# Patient Record
Sex: FEMALE | Race: WHITE | NOT HISPANIC OR LATINO | Employment: OTHER | URBAN - METROPOLITAN AREA
[De-identification: names, ages, dates, MRNs, and addresses within clinical notes are randomized per-mention and may not be internally consistent; named-entity substitution may affect disease eponyms.]

---

## 2020-07-04 ENCOUNTER — HOSPITAL ENCOUNTER (OUTPATIENT)
Facility: MEDICAL CENTER | Age: 75
End: 2020-07-04
Attending: PHYSICIAN ASSISTANT
Payer: MEDICARE

## 2020-07-04 ENCOUNTER — OFFICE VISIT (OUTPATIENT)
Dept: URGENT CARE | Facility: PHYSICIAN GROUP | Age: 75
End: 2020-07-04
Payer: MEDICARE

## 2020-07-04 ENCOUNTER — TELEPHONE (OUTPATIENT)
Dept: URGENT CARE | Facility: PHYSICIAN GROUP | Age: 75
End: 2020-07-04

## 2020-07-04 VITALS
TEMPERATURE: 97.6 F | DIASTOLIC BLOOD PRESSURE: 70 MMHG | HEART RATE: 97 BPM | OXYGEN SATURATION: 98 % | WEIGHT: 150 LBS | RESPIRATION RATE: 20 BRPM | HEIGHT: 63 IN | SYSTOLIC BLOOD PRESSURE: 140 MMHG | BODY MASS INDEX: 26.58 KG/M2

## 2020-07-04 DIAGNOSIS — R30.0 DYSURIA: ICD-10-CM

## 2020-07-04 DIAGNOSIS — N39.0 URINARY TRACT INFECTION WITH HEMATURIA, SITE UNSPECIFIED: ICD-10-CM

## 2020-07-04 DIAGNOSIS — R31.9 URINARY TRACT INFECTION WITH HEMATURIA, SITE UNSPECIFIED: ICD-10-CM

## 2020-07-04 LAB
APPEARANCE UR: NORMAL
BILIRUB UR STRIP-MCNC: NEGATIVE MG/DL
COLOR UR AUTO: NORMAL
GLUCOSE UR STRIP.AUTO-MCNC: NEGATIVE MG/DL
KETONES UR STRIP.AUTO-MCNC: NEGATIVE MG/DL
LEUKOCYTE ESTERASE UR QL STRIP.AUTO: NORMAL
NITRITE UR QL STRIP.AUTO: NORMAL
PH UR STRIP.AUTO: 6.5 [PH] (ref 5–8)
PROT UR QL STRIP: 30 MG/DL
RBC UR QL AUTO: NORMAL
SP GR UR STRIP.AUTO: 1.01
UROBILINOGEN UR STRIP-MCNC: 0.2 MG/DL

## 2020-07-04 PROCEDURE — 87186 SC STD MICRODIL/AGAR DIL: CPT

## 2020-07-04 PROCEDURE — 81002 URINALYSIS NONAUTO W/O SCOPE: CPT | Performed by: PHYSICIAN ASSISTANT

## 2020-07-04 PROCEDURE — 87086 URINE CULTURE/COLONY COUNT: CPT

## 2020-07-04 PROCEDURE — 99204 OFFICE O/P NEW MOD 45 MIN: CPT | Performed by: PHYSICIAN ASSISTANT

## 2020-07-04 PROCEDURE — 87077 CULTURE AEROBIC IDENTIFY: CPT

## 2020-07-04 RX ORDER — CEFDINIR 300 MG/1
300 CAPSULE ORAL EVERY 12 HOURS
Qty: 10 CAP | Refills: 0 | Status: SHIPPED | OUTPATIENT
Start: 2020-07-04 | End: 2020-07-04 | Stop reason: SDUPTHER

## 2020-07-04 RX ORDER — CEFDINIR 300 MG/1
300 CAPSULE ORAL EVERY 12 HOURS
Qty: 10 CAP | Refills: 0 | Status: SHIPPED | OUTPATIENT
Start: 2020-07-04 | End: 2020-07-09

## 2020-07-04 SDOH — HEALTH STABILITY: MENTAL HEALTH: HOW OFTEN DO YOU HAVE A DRINK CONTAINING ALCOHOL?: NEVER

## 2020-07-04 ASSESSMENT — ENCOUNTER SYMPTOMS
SHORTNESS OF BREATH: 0
VOMITING: 0
COUGH: 0
EYE DISCHARGE: 0
FEVER: 0
FLANK PAIN: 0
HEADACHES: 0
NAUSEA: 0
EYE REDNESS: 0
SORE THROAT: 0

## 2020-07-04 NOTE — PROGRESS NOTES
"Subjective:      Shelby Mireles is a 75 y.o. female who presents with Dysuria (x4 days)        Dysuria    This is a new problem. Episode onset: x 4 days ago. The problem occurs every urination. The problem has been unchanged. The quality of the pain is described as burning. There has been no fever. There is no history of pyelonephritis. Associated symptoms include frequency, hematuria and urgency. Pertinent negatives include no flank pain, nausea or vomiting. She has tried increased fluids for the symptoms. There is no history of kidney stones.     PMH:  has no past medical history on file.  MEDS: No current outpatient medications on file.  ALLERGIES:   Allergies   Allergen Reactions   • Tetracycline      Pt states she got a rash     SURGHX: No past surgical history on file.  SOCHX:  reports that she has never smoked. She has never used smokeless tobacco. She reports that she does not drink alcohol.  FH: Family history was reviewed, no pertinent findings to report      Review of Systems   Constitutional: Negative for fever.   HENT: Negative for congestion, ear pain and sore throat.    Eyes: Negative for discharge and redness.   Respiratory: Negative for cough and shortness of breath.    Cardiovascular: Negative for chest pain and leg swelling.   Gastrointestinal: Negative for nausea and vomiting.   Genitourinary: Positive for dysuria, frequency, hematuria and urgency. Negative for flank pain.   Musculoskeletal: Negative for joint pain.   Skin: Negative for rash.   Neurological: Negative for headaches.   All other systems reviewed and are negative.         Objective:     /70   Pulse 97   Temp 36.4 °C (97.6 °F) (Temporal)   Resp 20   Ht 1.6 m (5' 3\")   Wt 68 kg (150 lb)   SpO2 98%   BMI 26.57 kg/m²      Physical Exam  Constitutional:       General: She is not in acute distress.     Appearance: Normal appearance. She is well-developed. She is not ill-appearing.   HENT:      Head: Normocephalic and atraumatic. "      Right Ear: External ear normal.      Left Ear: External ear normal.      Nose: Nose normal.   Eyes:      Extraocular Movements: Extraocular movements intact.      Conjunctiva/sclera: Conjunctivae normal.   Neck:      Musculoskeletal: Normal range of motion and neck supple.   Cardiovascular:      Rate and Rhythm: Normal rate and regular rhythm.      Heart sounds: Normal heart sounds.   Pulmonary:      Effort: Pulmonary effort is normal. No respiratory distress.      Breath sounds: Normal breath sounds. No wheezing.   Abdominal:      Palpations: Abdomen is soft.      Tenderness: There is no abdominal tenderness. There is no right CVA tenderness or left CVA tenderness.   Musculoskeletal: Normal range of motion.   Skin:     General: Skin is warm and dry.   Neurological:      Mental Status: She is alert and oriented to person, place, and time.            Progress:  Results for orders placed or performed in visit on 07/04/20   POCT Urinalysis   Result Value Ref Range    POC Color light yellow Negative    POC Appearance slightly cloudy Negative    POC Leukocyte Esterase large Negative    POC Nitrites negtaive Negative    POC Urobiligen 0.2 Negative (0.2) mg/dL    POC Protein 30 Negative mg/dL    POC Urine PH 6.5 5.0 - 8.0    POC Blood large Negative    POC Specific Gravity 1.010 <1.005 - >1.030    POC Ketones negative Negative mg/dL    POC Bilirubin negative Negative mg/dL    POC Glucose negative Negative mg/dL     Urine Culture - pending      Assessment/Plan:     1. Dysuria  - POCT Urinalysis  - Urine Culture; Future    2. Urinary tract infection with hematuria, site unspecified  - cefdinir (OMNICEF) 300 MG Cap; Take 1 Cap by mouth every 12 hours for 5 days.  Dispense: 10 Cap; Refill: 0    The patient's presenting symptoms and physical exam findings are consistent with dysuria likely secondary to an acute urinary tract infection.  The patient's physical exam today in clinic was normal.  No CVA tenderness was  appreciated.  The patient's vital signs are stable and within normal limits.  The patient's urinalysis today in clinic showed large leukocyte esterace and large blood.  Will culture the patient's urine to identify the possible bacterial source.  Will prescribe the patient Cefdinir for presumed urinary tract infection.  Based on the patient's presenting symptoms and physical exam findings, it is unlikely the patient symptoms are due to acute pyelonephritis given the absence of fever, vomiting, and acute flank pain.  The patient reports no history of kidney stones.  Recommend OTC medications and supportive care for symptomatic management.  Recommend patient follow-up with her PCP.  Discussed return precautions with the patient, and she verbalized understanding.    Differential diagnoses, supportive care, and indications for immediate follow-up discussed with patient.   Instructed to return to clinic or nearest emergency department for any change in condition, further concerns, or worsening of symptoms.    OTC Tylenol or Motrin for fever/discomfort.  Drink plenty of fluids  Follow-up with PCP  Return to clinic or go to the ED if symptoms worsen or fail to improve, or if the patient should develop worsening/increasing urinary symptoms, hematuria, flank pain, abdominal pain, nausea/vomiting, fever/chills, and/or any concerning symptoms.    Discussed plan with the patient, and she agrees to the above.     Please note that this dictation was created using voice recognition software. I have made every reasonable attempt to correct obvious errors, but I expect that there may be errors of grammar and possibly content that I did not discover before finalizing the note.

## 2020-07-04 NOTE — TELEPHONE ENCOUNTER
Pt RX for UTI Was called into [facility] on [address], Not open today, Please call in to [facility] on  [address] open 24 hrs.    Thank you   Maureen

## 2020-07-05 LAB
AMBIGUOUS DTTM AMBI4: NORMAL
SIGNIFICANT IND 70042: NORMAL
SITE SITE: NORMAL
SOURCE SOURCE: NORMAL

## 2020-07-07 LAB
BACTERIA UR CULT: ABNORMAL
BACTERIA UR CULT: ABNORMAL
SIGNIFICANT IND 70042: ABNORMAL
SITE SITE: ABNORMAL
SOURCE SOURCE: ABNORMAL

## 2021-01-12 DIAGNOSIS — Z23 NEED FOR VACCINATION: ICD-10-CM

## 2021-04-14 ENCOUNTER — OFFICE VISIT (OUTPATIENT)
Dept: URGENT CARE | Facility: PHYSICIAN GROUP | Age: 76
End: 2021-04-14
Payer: MEDICARE

## 2021-04-14 ENCOUNTER — HOSPITAL ENCOUNTER (OUTPATIENT)
Dept: LAB | Facility: MEDICAL CENTER | Age: 76
End: 2021-04-14
Attending: PHYSICIAN ASSISTANT
Payer: MEDICARE

## 2021-04-14 VITALS
TEMPERATURE: 97.8 F | RESPIRATION RATE: 18 BRPM | DIASTOLIC BLOOD PRESSURE: 76 MMHG | SYSTOLIC BLOOD PRESSURE: 138 MMHG | HEART RATE: 115 BPM | HEIGHT: 63 IN | OXYGEN SATURATION: 97 % | BODY MASS INDEX: 27.29 KG/M2 | WEIGHT: 154 LBS

## 2021-04-14 DIAGNOSIS — R14.0 ABDOMINAL DISTENSION (GASEOUS): ICD-10-CM

## 2021-04-14 DIAGNOSIS — R14.0 BLOATING: ICD-10-CM

## 2021-04-14 DIAGNOSIS — R11.0 NAUSEA: ICD-10-CM

## 2021-04-14 DIAGNOSIS — K57.30 DIVERTICULOSIS OF COLON: ICD-10-CM

## 2021-04-14 DIAGNOSIS — Z87.19 HISTORY OF HIATAL HERNIA: ICD-10-CM

## 2021-04-14 DIAGNOSIS — R10.84 GENERALIZED ABDOMINAL PAIN: ICD-10-CM

## 2021-04-14 LAB
ALBUMIN SERPL BCP-MCNC: 4.8 G/DL (ref 3.2–4.9)
ALBUMIN/GLOB SERPL: 1.7 G/DL
ALP SERPL-CCNC: 86 U/L (ref 30–99)
ALT SERPL-CCNC: 31 U/L (ref 2–50)
ANION GAP SERPL CALC-SCNC: 14 MMOL/L (ref 7–16)
APPEARANCE UR: CLEAR
AST SERPL-CCNC: 29 U/L (ref 12–45)
BASOPHILS # BLD AUTO: 0.4 % (ref 0–1.8)
BASOPHILS # BLD: 0.02 K/UL (ref 0–0.12)
BILIRUB SERPL-MCNC: 0.9 MG/DL (ref 0.1–1.5)
BILIRUB UR STRIP-MCNC: NORMAL MG/DL
BUN SERPL-MCNC: 9 MG/DL (ref 8–22)
CALCIUM SERPL-MCNC: 9.9 MG/DL (ref 8.5–10.5)
CHLORIDE SERPL-SCNC: 103 MMOL/L (ref 96–112)
CO2 SERPL-SCNC: 23 MMOL/L (ref 20–33)
COLOR UR AUTO: YELLOW
CREAT SERPL-MCNC: 0.53 MG/DL (ref 0.5–1.4)
EOSINOPHIL # BLD AUTO: 0.04 K/UL (ref 0–0.51)
EOSINOPHIL NFR BLD: 0.7 % (ref 0–6.9)
ERYTHROCYTE [DISTWIDTH] IN BLOOD BY AUTOMATED COUNT: 43.9 FL (ref 35.9–50)
GLOBULIN SER CALC-MCNC: 2.8 G/DL (ref 1.9–3.5)
GLUCOSE SERPL-MCNC: 121 MG/DL (ref 65–99)
GLUCOSE UR STRIP.AUTO-MCNC: NORMAL MG/DL
HCT VFR BLD AUTO: 50.6 % (ref 37–47)
HGB BLD-MCNC: 17 G/DL (ref 12–16)
IMM GRANULOCYTES # BLD AUTO: 0.02 K/UL (ref 0–0.11)
IMM GRANULOCYTES NFR BLD AUTO: 0.4 % (ref 0–0.9)
KETONES UR STRIP.AUTO-MCNC: 15 MG/DL
LEUKOCYTE ESTERASE UR QL STRIP.AUTO: NORMAL
LIPASE SERPL-CCNC: 20 U/L (ref 11–82)
LYMPHOCYTES # BLD AUTO: 1.4 K/UL (ref 1–4.8)
LYMPHOCYTES NFR BLD: 24.7 % (ref 22–41)
MCH RBC QN AUTO: 30.2 PG (ref 27–33)
MCHC RBC AUTO-ENTMCNC: 33.6 G/DL (ref 33.6–35)
MCV RBC AUTO: 90 FL (ref 81.4–97.8)
MONOCYTES # BLD AUTO: 0.42 K/UL (ref 0–0.85)
MONOCYTES NFR BLD AUTO: 7.4 % (ref 0–13.4)
NEUTROPHILS # BLD AUTO: 3.76 K/UL (ref 2–7.15)
NEUTROPHILS NFR BLD: 66.4 % (ref 44–72)
NITRITE UR QL STRIP.AUTO: NORMAL
NRBC # BLD AUTO: 0 K/UL
NRBC BLD-RTO: 0 /100 WBC
PH UR STRIP.AUTO: 6.5 [PH] (ref 5–8)
PLATELET # BLD AUTO: 208 K/UL (ref 164–446)
PMV BLD AUTO: 10.4 FL (ref 9–12.9)
POTASSIUM SERPL-SCNC: 4.1 MMOL/L (ref 3.6–5.5)
PROT SERPL-MCNC: 7.6 G/DL (ref 6–8.2)
PROT UR QL STRIP: NORMAL MG/DL
RBC # BLD AUTO: 5.62 M/UL (ref 4.2–5.4)
RBC UR QL AUTO: NORMAL
SODIUM SERPL-SCNC: 140 MMOL/L (ref 135–145)
SP GR UR STRIP.AUTO: 1.01
UROBILINOGEN UR STRIP-MCNC: 0.2 MG/DL
WBC # BLD AUTO: 5.7 K/UL (ref 4.8–10.8)

## 2021-04-14 PROCEDURE — 81002 URINALYSIS NONAUTO W/O SCOPE: CPT | Performed by: PHYSICIAN ASSISTANT

## 2021-04-14 PROCEDURE — 83690 ASSAY OF LIPASE: CPT

## 2021-04-14 PROCEDURE — 99215 OFFICE O/P EST HI 40 MIN: CPT | Performed by: PHYSICIAN ASSISTANT

## 2021-04-14 PROCEDURE — 36415 COLL VENOUS BLD VENIPUNCTURE: CPT

## 2021-04-14 PROCEDURE — 80053 COMPREHEN METABOLIC PANEL: CPT

## 2021-04-14 PROCEDURE — 85025 COMPLETE CBC W/AUTO DIFF WBC: CPT

## 2021-04-14 RX ORDER — ONDANSETRON HYDROCHLORIDE 8 MG/1
8 TABLET, FILM COATED ORAL EVERY 8 HOURS PRN
Qty: 15 EACH | Refills: 0 | Status: SHIPPED | OUTPATIENT
Start: 2021-04-14 | End: 2021-04-19

## 2021-04-14 ASSESSMENT — ENCOUNTER SYMPTOMS
FLATUS: 1
VOMITING: 0
NAUSEA: 1
NERVOUS/ANXIOUS: 1
ANOREXIA: 1
FLANK PAIN: 0
FEVER: 0
BACK PAIN: 1
CONSTIPATION: 1
DIARRHEA: 0
CHILLS: 0
ABDOMINAL PAIN: 1
WEIGHT LOSS: 0

## 2021-04-14 NOTE — PROGRESS NOTES
"Subjective:      Shelby Mireles is a 76 y.o. female who presents with Abdominal Pain (constipation, bloating, back pain, nausea, pt states ovarian cancer)            Patient is a 76-year-old female who presents to urgent care with 2 weeks of abdominal bloating, intermittent constipation, worsening nausea and diffuse abdominal pain worse on the left upper aspect of her abdomen however the last day all over.Patient reports 6 days ago she had evaluation at [facility] urgent care of which during that time she had an abdominal x-ray along with chest x-ray she believes of which were negative in particular no evidence of obstruction.  She does have history of diverticulosis on a colonoscopy approximately 5 years ago of which no known polyps were found.  She was started on Cipro for suspected diverticulitis of which she reports that she discontinued 3 days later as she developed Achilles pain.  She also reports that this did not make a difference to her symptoms.  Patient does report small bowel movement this morning however this does not alleviate any of her symptoms.  She denies any fevers or chills but does report worsening nausea and slight shortness of breath as she feels notably bloated and \"full very easily \".  Patient readily admits that she feels very nervous as she has had a few friends previously diagnosed with ovarian cancer of which she feels that some of her symptoms are consistent with such.  She denies prior history of breast masses and previous mammogram was normal.  She denies prior abdominal surgeries.  Also denies any vaginal or urinary symptoms.    Abdominal Pain  This is a new problem. Episode onset: 2 weeks ago. The onset quality is gradual. The problem occurs constantly. The problem has been waxing and waning. The pain is located in the generalized abdominal region and LUQ. The pain is moderate. The quality of the pain is a sensation of fullness. Pain radiation: Some radiation to bilateral flanks. " "Associated symptoms include anorexia, constipation, flatus and nausea. Pertinent negatives include no diarrhea, dysuria, fever, frequency, hematuria, vomiting or weight loss. Nothing aggravates the pain. The pain is relieved by nothing. Treatments tried: Over-the-counter reflux medication. The treatment provided no relief. Prior workup: As above.       Review of Systems   Constitutional: Negative for chills, fever and weight loss.   Gastrointestinal: Positive for abdominal pain, anorexia, constipation, flatus and nausea. Negative for diarrhea and vomiting.   Genitourinary: Negative for dysuria, flank pain, frequency, hematuria and urgency.   Musculoskeletal: Positive for back pain.   Psychiatric/Behavioral: The patient is nervous/anxious.    All other systems reviewed and are negative.         Objective:     /76   Pulse (!) 115   Temp 36.6 °C (97.8 °F) (Temporal)   Resp 18   Ht 1.6 m (5' 3\")   Wt 69.9 kg (154 lb)   SpO2 97%   BMI 27.28 kg/m²    PMH: Reports history of hiatal hernia and history of reflux.    History of diverticulosis.  MEDS: Reviewed .   ALLERGIES:   Allergies   Allergen Reactions   • Tetracycline      Pt states she got a rash     SURGHX: No past surgical history on file.  SOCHX:  reports that she has never smoked. She has never used smokeless tobacco. She reports that she does not drink alcohol.  FH: Family history was reviewed, no pertinent findings to report    Physical Exam  Vitals reviewed.   Constitutional:       General: She is not in acute distress.     Appearance: She is well-developed.   HENT:      Head: Normocephalic and atraumatic.      Right Ear: External ear normal.      Left Ear: External ear normal.      Mouth/Throat:      Pharynx: No oropharyngeal exudate.   Eyes:      Conjunctiva/sclera: Conjunctivae normal.      Pupils: Pupils are equal, round, and reactive to light.   Neck:      Trachea: No tracheal deviation.   Cardiovascular:      Rate and Rhythm: Normal rate and " regular rhythm.      Heart sounds: No murmur.   Pulmonary:      Effort: Pulmonary effort is normal. No respiratory distress.      Breath sounds: Normal breath sounds.   Abdominal:      General: There is distension.      Comments: Hypoactive bowel sounds.  Diffuse mild generalized tenderness greatest to left upper quadrant and left lower quadrant.  Negative heeltap.   Musculoskeletal:         General: Normal range of motion.      Cervical back: Normal range of motion and neck supple.   Skin:     General: Skin is warm.      Findings: No rash.   Neurological:      Mental Status: She is alert and oriented to person, place, and time.      Coordination: Coordination normal.   Psychiatric:         Behavior: Behavior normal.         Thought Content: Thought content normal.         Judgment: Judgment normal.              Urinalysis-clear.  Assessment/Plan:        1. Generalized abdominal pain  - CBC WITH DIFFERENTIAL; Future  - COMP METABOLIC PANEL  - LIPASE; Future  - CT-ABDOMEN-PELVIS WITH; Future    2. Nausea  - CBC WITH DIFFERENTIAL; Future  - COMP METABOLIC PANEL  - LIPASE; Future  - ondansetron (ZOFRAN) 8 MG Tab; Take 1 tablet by mouth every 8 hours as needed for Nausea/Vomiting for up to 5 days.  Dispense: 15 Each; Refill: 0    3. Bloating  - CBC WITH DIFFERENTIAL; Future  - COMP METABOLIC PANEL  - LIPASE; Future    4. History of hiatal hernia  5. Diverticulosis of colon  6. Abdominal distension (gaseous)  - CT-ABDOMEN-PELVIS WITH; Future    Patient with previous work-up last week with negative abdominal x-ray for potential obstruction and appears she also had negative chest x-ray.  Patient is with diffuse abdominal discomfort along with worsening bloating and associated nausea.  Will write for antiemetic at this time.  I will order abdominal CT-for further evaluation.  Will order blood work as renal function will be needed for further evaluation prior to CT scan.  My chart was initiated today as well.  Appropriate  PPE worn at all times by provider.   Pt. Had face mask on throughout entirety of the visit other than oropharyngeal examination today.   Urinalysis noncontributory.  Patient is to have blood work this evening and will have CT scan tomorrow.  Strict ER precautions were discussed in the interim.  I will follow-up with this patient as results return prompting further treatment plan.    Appropriate PPE worn at all times by provider.   Pt. Had face mask on throughout entirety of the visit other than oropharyngeal examination today.     Instructed to return to clinic or nearest emergency department if we are not available for any change in condition, further concerns, or worsening of symptoms.    The patient and/or guardian demonstrated a good understanding and agreed with the treatment plan.    Please note that this dictation was created using voice recognition software. I have made every reasonable attempt to correct obvious errors, but I expect that there are errors of grammar and possibly content that I did not discover before finalizing the note.

## 2021-04-15 ENCOUNTER — HOSPITAL ENCOUNTER (OUTPATIENT)
Dept: RADIOLOGY | Facility: MEDICAL CENTER | Age: 76
End: 2021-04-15
Attending: PHYSICIAN ASSISTANT
Payer: MEDICARE

## 2021-04-15 DIAGNOSIS — R10.84 GENERALIZED ABDOMINAL PAIN: ICD-10-CM

## 2021-04-15 DIAGNOSIS — R14.0 ABDOMINAL DISTENSION (GASEOUS): ICD-10-CM

## 2021-04-15 PROCEDURE — 74177 CT ABD & PELVIS W/CONTRAST: CPT | Mod: MH

## 2021-04-15 PROCEDURE — 700117 HCHG RX CONTRAST REV CODE 255: Performed by: PHYSICIAN ASSISTANT

## 2021-04-15 RX ADMIN — IOHEXOL 25 ML: 240 INJECTION, SOLUTION INTRATHECAL; INTRAVASCULAR; INTRAVENOUS; ORAL at 11:12

## 2021-04-15 RX ADMIN — IOHEXOL 100 ML: 350 INJECTION, SOLUTION INTRAVENOUS at 11:12

## 2021-04-18 DIAGNOSIS — K44.9 HIATAL HERNIA: ICD-10-CM

## 2021-04-30 ENCOUNTER — HOSPITAL ENCOUNTER (OUTPATIENT)
Dept: LAB | Facility: MEDICAL CENTER | Age: 76
End: 2021-04-30
Attending: STUDENT IN AN ORGANIZED HEALTH CARE EDUCATION/TRAINING PROGRAM
Payer: MEDICARE

## 2021-04-30 PROCEDURE — 82784 ASSAY IGA/IGD/IGG/IGM EACH: CPT

## 2021-04-30 PROCEDURE — 36415 COLL VENOUS BLD VENIPUNCTURE: CPT

## 2021-04-30 PROCEDURE — 83516 IMMUNOASSAY NONANTIBODY: CPT

## 2021-05-02 LAB — IGA SERPL-MCNC: 254 MG/DL (ref 68–408)

## 2021-05-03 ENCOUNTER — HOSPITAL ENCOUNTER (OUTPATIENT)
Dept: RADIOLOGY | Facility: MEDICAL CENTER | Age: 76
End: 2021-05-03
Attending: STUDENT IN AN ORGANIZED HEALTH CARE EDUCATION/TRAINING PROGRAM
Payer: MEDICARE

## 2021-05-03 DIAGNOSIS — R06.09 DYSPNEA ON EXERTION: ICD-10-CM

## 2021-05-03 DIAGNOSIS — R14.0 BLOATING: ICD-10-CM

## 2021-05-03 DIAGNOSIS — K44.9 HIATAL HERNIA: ICD-10-CM

## 2021-05-03 DIAGNOSIS — K59.00 CONSTIPATION, UNSPECIFIED CONSTIPATION TYPE: ICD-10-CM

## 2021-05-03 LAB — GLIADIN PEPTIDE+TTG IGA+IGG SER QL IA: 5 UNITS (ref 0–19)

## 2021-05-03 PROCEDURE — A9270 NON-COVERED ITEM OR SERVICE: HCPCS | Performed by: STUDENT IN AN ORGANIZED HEALTH CARE EDUCATION/TRAINING PROGRAM

## 2021-05-03 PROCEDURE — 74221 X-RAY XM ESOPHAGUS 2CNTRST: CPT

## 2021-05-03 PROCEDURE — 700112 HCHG RX REV CODE 229: Performed by: STUDENT IN AN ORGANIZED HEALTH CARE EDUCATION/TRAINING PROGRAM

## 2021-05-03 PROCEDURE — 700117 HCHG RX CONTRAST REV CODE 255: Performed by: STUDENT IN AN ORGANIZED HEALTH CARE EDUCATION/TRAINING PROGRAM

## 2021-05-03 RX ADMIN — ANTACID/ANTIFLATULENT 1 PACKET: 380; 550; 10; 10 GRANULE, EFFERVESCENT ORAL at 13:30

## 2021-05-03 RX ADMIN — BARIUM SULFATE 700 MG: 700 TABLET ORAL at 13:30

## 2021-05-21 ENCOUNTER — OFFICE VISIT (OUTPATIENT)
Dept: CARDIOLOGY | Facility: MEDICAL CENTER | Age: 76
End: 2021-05-21
Payer: MEDICARE

## 2021-05-21 VITALS
RESPIRATION RATE: 18 BRPM | SYSTOLIC BLOOD PRESSURE: 140 MMHG | OXYGEN SATURATION: 95 % | HEIGHT: 63 IN | HEART RATE: 87 BPM | WEIGHT: 152 LBS | DIASTOLIC BLOOD PRESSURE: 90 MMHG | BODY MASS INDEX: 26.93 KG/M2

## 2021-05-21 DIAGNOSIS — K44.9 HIATAL HERNIA: ICD-10-CM

## 2021-05-21 DIAGNOSIS — R13.19 ESOPHAGEAL DYSPHAGIA: ICD-10-CM

## 2021-05-21 DIAGNOSIS — E78.2 MIXED HYPERLIPIDEMIA: ICD-10-CM

## 2021-05-21 DIAGNOSIS — R00.2 PALPITATIONS: Primary | ICD-10-CM

## 2021-05-21 DIAGNOSIS — R06.02 SOB (SHORTNESS OF BREATH): ICD-10-CM

## 2021-05-21 PROCEDURE — 93000 ELECTROCARDIOGRAM COMPLETE: CPT | Performed by: INTERNAL MEDICINE

## 2021-05-21 PROCEDURE — 99204 OFFICE O/P NEW MOD 45 MIN: CPT | Performed by: INTERNAL MEDICINE

## 2021-05-21 RX ORDER — CIPROFLOXACIN 500 MG/1
TABLET, FILM COATED ORAL
Status: ON HOLD | COMMUNITY
Start: 2021-04-08 | End: 2021-08-11

## 2021-05-21 ASSESSMENT — ENCOUNTER SYMPTOMS
EYES NEGATIVE: 1
CARDIOVASCULAR NEGATIVE: 1
DIZZINESS: 0
SORE THROAT: 0
SHORTNESS OF BREATH: 0
BRUISES/BLEEDS EASILY: 0
CONSTITUTIONAL NEGATIVE: 1
GASTROINTESTINAL NEGATIVE: 1
NEUROLOGICAL NEGATIVE: 1
WHEEZING: 0
LOSS OF CONSCIOUSNESS: 0
MUSCULOSKELETAL NEGATIVE: 1
PND: 0
CHILLS: 0
PALPITATIONS: 0
WEAKNESS: 0
FEVER: 0
STRIDOR: 0
COUGH: 0
RESPIRATORY NEGATIVE: 1
CLAUDICATION: 0
SPUTUM PRODUCTION: 0
HEMOPTYSIS: 0
ORTHOPNEA: 0

## 2021-05-21 ASSESSMENT — FIBROSIS 4 INDEX: FIB4 SCORE: 1.95

## 2021-05-21 NOTE — PROGRESS NOTES
Chief Complaint   Patient presents with   • Medical Clearance     F/V Dx: surgical clearance   • Chest Pressure   • Shortness of Breath       Subjective:   Shelby Mireles is a 76 y.o. female who presents today as an evaluation for shortness of breath.  She is a 76-year-old female with a longstanding history of a long enlarged hiatal hernia.  She is being worked up by GI for this issue.  She does have high cholesterol but has been diet controlled.  Her blood pressure is equally borderline controlled.  She never gets chest pain or pressure with exertion.  Her biggest issue is shortness of breath.  She can climb 2 flights of stairs without stopping.    History reviewed. No pertinent past medical history.  History reviewed. No pertinent surgical history.  History reviewed. No pertinent family history.  Social History     Socioeconomic History   • Marital status:      Spouse name: Not on file   • Number of children: Not on file   • Years of education: Not on file   • Highest education level: Not on file   Occupational History   • Not on file   Tobacco Use   • Smoking status: Never Smoker   • Smokeless tobacco: Never Used   Vaping Use   • Vaping Use: Never used   Substance and Sexual Activity   • Alcohol use: Never   • Drug use: Not Currently   • Sexual activity: Not on file   Other Topics Concern   • Not on file   Social History Narrative   • Not on file     Social Determinants of Health     Financial Resource Strain:    • Difficulty of Paying Living Expenses:    Food Insecurity:    • Worried About Running Out of Food in the Last Year:    • Ran Out of Food in the Last Year:    Transportation Needs:    • Lack of Transportation (Medical):    • Lack of Transportation (Non-Medical):    Physical Activity:    • Days of Exercise per Week:    • Minutes of Exercise per Session:    Stress:    • Feeling of Stress :    Social Connections:    • Frequency of Communication with Friends and Family:    • Frequency of Social  "Gatherings with Friends and Family:    • Attends Temple Services:    • Active Member of Clubs or Organizations:    • Attends Club or Organization Meetings:    • Marital Status:    Intimate Partner Violence:    • Fear of Current or Ex-Partner:    • Emotionally Abused:    • Physically Abused:    • Sexually Abused:      Allergies   Allergen Reactions   • Tetracycline      Pt states she got a rash     Outpatient Encounter Medications as of 5/21/2021   Medication Sig Dispense Refill   • ciprofloxacin (CIPRO) 500 MG Tab      • esomeprazole (NEXIUM 24HR) 20 MG capsule Take 20 mg by mouth every day.       No facility-administered encounter medications on file as of 5/21/2021.     Review of Systems   Constitutional: Negative.  Negative for chills, fever and malaise/fatigue.   HENT: Negative.  Negative for sore throat.    Eyes: Negative.    Respiratory: Negative.  Negative for cough, hemoptysis, sputum production, shortness of breath, wheezing and stridor.    Cardiovascular: Negative.  Negative for chest pain, palpitations, orthopnea, claudication, leg swelling and PND.   Gastrointestinal: Negative.    Genitourinary: Negative.    Musculoskeletal: Negative.    Skin: Negative.    Neurological: Negative.  Negative for dizziness, loss of consciousness and weakness.   Endo/Heme/Allergies: Negative.  Does not bruise/bleed easily.   All other systems reviewed and are negative.       Objective:   /90 (BP Location: Left arm, Patient Position: Sitting, BP Cuff Size: Adult)   Pulse 87   Resp 18   Ht 1.6 m (5' 3\")   Wt 68.9 kg (152 lb)   SpO2 95%   BMI 26.93 kg/m²     Physical Exam   Constitutional: She appears well-developed. No distress.   HENT:   Head: Normocephalic and atraumatic.   Right Ear: External ear normal.   Left Ear: External ear normal.   Nose: Nose normal.   Mouth/Throat: No oropharyngeal exudate.   Eyes: Pupils are equal, round, and reactive to light. Conjunctivae are normal. Right eye exhibits no " discharge. Left eye exhibits no discharge. No scleral icterus.   Neck: No JVD present.   Cardiovascular: Normal rate and regular rhythm. Exam reveals no gallop and no friction rub.   No murmur heard.  Pulmonary/Chest: Effort normal. No stridor. No respiratory distress. She has no wheezes. She has no rales. She exhibits no tenderness.   Abdominal: Soft. She exhibits no distension. There is no guarding.   Musculoskeletal:         General: No tenderness or deformity. Normal range of motion.      Cervical back: Neck supple.   Neurological: She is alert. She has normal reflexes. She displays normal reflexes. No cranial nerve deficit. She exhibits normal muscle tone. Coordination normal.   Skin: Skin is warm and dry. No rash noted. She is not diaphoretic. No erythema. No pallor.   Psychiatric: Her behavior is normal. Judgment and thought content normal.   Nursing note and vitals reviewed.    CT abdomen pelvis: Reviewed by myself showing no coronary calcification.    No results found for: CHOLSTRLTOT, LDL, HDL, TRIGLYCERIDE    Lab Results   Component Value Date/Time    SODIUM 140 04/14/2021 02:18 PM    POTASSIUM 4.1 04/14/2021 02:18 PM    CHLORIDE 103 04/14/2021 02:18 PM    CO2 23 04/14/2021 02:18 PM    GLUCOSE 121 (H) 04/14/2021 02:18 PM    BUN 9 04/14/2021 02:18 PM    CREATININE 0.53 04/14/2021 02:18 PM     Lab Results   Component Value Date/Time    ALKPHOSPHAT 86 04/14/2021 02:18 PM    ASTSGOT 29 04/14/2021 02:18 PM    ALTSGPT 31 04/14/2021 02:18 PM    TBILIRUBIN 0.9 04/14/2021 02:18 PM        Assessment:     1. Palpitations  EKG   2. SOB (shortness of breath)     3. Hiatal hernia     4. Esophageal dysphagia     5. Mixed hyperlipidemia         Medical Decision Making:  Today's Assessment / Status / Plan:     76-year-old female with good exertional tolerance with no issues at this point.  I do not feel that she requires any further testing.  She can complete greater than 4 METS with no issues.  I do not think that this  cardiac in origin.  I did offer her further testing including a treadmill or stress test but she declined which I think is appropriate given her functional capacity.  We will see her back on an as-needed basis.

## 2021-05-22 LAB — EKG IMPRESSION: NORMAL

## 2021-05-26 ENCOUNTER — TELEPHONE (OUTPATIENT)
Dept: CARDIOLOGY | Facility: MEDICAL CENTER | Age: 76
End: 2021-05-26

## 2021-05-26 NOTE — TELEPHONE ENCOUNTER
Received stratification request from [facility].    Procedure: EGD and Colonoscopy with Deep Sedation on 6/21/21.     To RO, ok to proceed? Last OV 5/21/21

## 2021-06-03 ENCOUNTER — APPOINTMENT (RX ONLY)
Dept: URBAN - METROPOLITAN AREA CLINIC 22 | Facility: CLINIC | Age: 76
Setting detail: DERMATOLOGY
End: 2021-06-03

## 2021-06-03 DIAGNOSIS — L82.1 OTHER SEBORRHEIC KERATOSIS: ICD-10-CM

## 2021-06-03 DIAGNOSIS — Z71.89 OTHER SPECIFIED COUNSELING: ICD-10-CM

## 2021-06-03 DIAGNOSIS — L81.4 OTHER MELANIN HYPERPIGMENTATION: ICD-10-CM

## 2021-06-03 DIAGNOSIS — L71.8 OTHER ROSACEA: ICD-10-CM | Status: INADEQUATELY CONTROLLED

## 2021-06-03 DIAGNOSIS — D22 MELANOCYTIC NEVI: ICD-10-CM

## 2021-06-03 DIAGNOSIS — L259 CONTACT DERMATITIS AND OTHER ECZEMA, UNSPECIFIED CAUSE: ICD-10-CM

## 2021-06-03 DIAGNOSIS — L70.8 OTHER ACNE: ICD-10-CM

## 2021-06-03 DIAGNOSIS — D18.0 HEMANGIOMA: ICD-10-CM

## 2021-06-03 DIAGNOSIS — L30.4 ERYTHEMA INTERTRIGO: ICD-10-CM | Status: INADEQUATELY CONTROLLED

## 2021-06-03 PROBLEM — D22.5 MELANOCYTIC NEVI OF TRUNK: Status: ACTIVE | Noted: 2021-06-03

## 2021-06-03 PROBLEM — L30.8 OTHER SPECIFIED DERMATITIS: Status: ACTIVE | Noted: 2021-06-03

## 2021-06-03 PROBLEM — D18.01 HEMANGIOMA OF SKIN AND SUBCUTANEOUS TISSUE: Status: ACTIVE | Noted: 2021-06-03

## 2021-06-03 PROCEDURE — ? EXTRACTIONS

## 2021-06-03 PROCEDURE — 99204 OFFICE O/P NEW MOD 45 MIN: CPT

## 2021-06-03 PROCEDURE — ? TREATMENT REGIMEN

## 2021-06-03 PROCEDURE — ? COUNSELING

## 2021-06-03 PROCEDURE — ? SUNSCREEN RECOMMENDATIONS

## 2021-06-03 PROCEDURE — ? PRESCRIPTION

## 2021-06-03 RX ORDER — HYDROCORTISONE 25 MG/G
1 CREAM TOPICAL BID
Qty: 1 | Refills: 1 | Status: ERX | COMMUNITY
Start: 2021-06-03

## 2021-06-03 RX ORDER — IVERMECTIN 10 MG/G
1 CREAM TOPICAL QD
Qty: 1 | Refills: 5 | Status: ERX | COMMUNITY
Start: 2021-06-03

## 2021-06-03 RX ORDER — NYSTATIN 100000 [USP'U]/G
1 CREAM TOPICAL TID
Qty: 1 | Refills: 2 | Status: ERX | COMMUNITY
Start: 2021-06-03

## 2021-06-03 RX ADMIN — IVERMECTIN 1: 10 CREAM TOPICAL at 00:00

## 2021-06-03 RX ADMIN — NYSTATIN 1: 100000 CREAM TOPICAL at 00:00

## 2021-06-03 RX ADMIN — HYDROCORTISONE 1: 25 CREAM TOPICAL at 00:00

## 2021-06-03 ASSESSMENT — LOCATION SIMPLE DESCRIPTION DERM
LOCATION SIMPLE: LEFT UPPER BACK
LOCATION SIMPLE: SUPERIOR FOREHEAD
LOCATION SIMPLE: ABDOMEN
LOCATION SIMPLE: RIGHT UPPER BACK
LOCATION SIMPLE: LEFT AXILLARY VAULT
LOCATION SIMPLE: NOSE
LOCATION SIMPLE: RIGHT AXILLARY VAULT
LOCATION SIMPLE: RIGHT LOWER BACK
LOCATION SIMPLE: LEFT CHEEK

## 2021-06-03 ASSESSMENT — LOCATION ZONE DERM
LOCATION ZONE: AXILLAE
LOCATION ZONE: FACE
LOCATION ZONE: NOSE
LOCATION ZONE: TRUNK

## 2021-06-03 ASSESSMENT — LOCATION DETAILED DESCRIPTION DERM
LOCATION DETAILED: RIGHT SUPERIOR MEDIAL MIDBACK
LOCATION DETAILED: NASAL DORSUM
LOCATION DETAILED: RIGHT SUPERIOR UPPER BACK
LOCATION DETAILED: LEFT SUPERIOR MEDIAL UPPER BACK
LOCATION DETAILED: SUPERIOR MID FOREHEAD
LOCATION DETAILED: LEFT MEDIAL MALAR CHEEK
LOCATION DETAILED: LEFT AXILLARY VAULT
LOCATION DETAILED: EPIGASTRIC SKIN
LOCATION DETAILED: RIGHT AXILLARY VAULT

## 2021-06-03 NOTE — PROCEDURE: EXTRACTIONS
Render Number Of Lesions Treated: no
Post-Care Instructions: I reviewed with the patient in detail post-care instructions. Patient is to keep the treatment areas dry overnight, and then apply bacitracin twice daily until healed. Patient may apply hydrogen peroxide soaks to remove any crusting.
Extraction Method: 11 blade and comedo extractor
Acne Type: A comedo
Prep Text (Optional): Prior to removal the treatment areas were prepped in the usual fashion.
Detail Level: Detailed
Consent was obtained and risks were reviewed including but not limited to scarring, infection, bleeding, scabbing, incomplete removal, and allergy to anesthesia.

## 2021-07-26 ENCOUNTER — PRE-ADMISSION TESTING (OUTPATIENT)
Dept: ADMISSIONS | Facility: MEDICAL CENTER | Age: 76
End: 2021-07-26
Attending: SURGERY
Payer: MEDICARE

## 2021-07-26 DIAGNOSIS — Z01.810 PRE-OPERATIVE CARDIOVASCULAR EXAMINATION: ICD-10-CM

## 2021-07-26 DIAGNOSIS — Z01.812 PRE-OPERATIVE LABORATORY EXAMINATION: ICD-10-CM

## 2021-07-26 LAB
ANION GAP SERPL CALC-SCNC: 12 MMOL/L (ref 7–16)
BUN SERPL-MCNC: 18 MG/DL (ref 8–22)
CALCIUM SERPL-MCNC: 9.6 MG/DL (ref 8.5–10.5)
CHLORIDE SERPL-SCNC: 104 MMOL/L (ref 96–112)
CO2 SERPL-SCNC: 24 MMOL/L (ref 20–33)
CREAT SERPL-MCNC: 0.59 MG/DL (ref 0.5–1.4)
ERYTHROCYTE [DISTWIDTH] IN BLOOD BY AUTOMATED COUNT: 43.6 FL (ref 35.9–50)
GLUCOSE SERPL-MCNC: 85 MG/DL (ref 65–99)
HCT VFR BLD AUTO: 45.5 % (ref 37–47)
HGB BLD-MCNC: 15.5 G/DL (ref 12–16)
MCH RBC QN AUTO: 30.3 PG (ref 27–33)
MCHC RBC AUTO-ENTMCNC: 34.1 G/DL (ref 33.6–35)
MCV RBC AUTO: 89 FL (ref 81.4–97.8)
PLATELET # BLD AUTO: 182 K/UL (ref 164–446)
PMV BLD AUTO: 10.1 FL (ref 9–12.9)
POTASSIUM SERPL-SCNC: 4.1 MMOL/L (ref 3.6–5.5)
RBC # BLD AUTO: 5.11 M/UL (ref 4.2–5.4)
SODIUM SERPL-SCNC: 140 MMOL/L (ref 135–145)
WBC # BLD AUTO: 4.9 K/UL (ref 4.8–10.8)

## 2021-07-26 PROCEDURE — 85027 COMPLETE CBC AUTOMATED: CPT

## 2021-07-26 PROCEDURE — 36415 COLL VENOUS BLD VENIPUNCTURE: CPT

## 2021-07-26 PROCEDURE — 80048 BASIC METABOLIC PNL TOTAL CA: CPT

## 2021-07-26 ASSESSMENT — FIBROSIS 4 INDEX: FIB4 SCORE: 1.95

## 2021-08-10 ENCOUNTER — ANESTHESIA EVENT (OUTPATIENT)
Dept: SURGERY | Facility: MEDICAL CENTER | Age: 76
End: 2021-08-10
Payer: MEDICARE

## 2021-08-11 ENCOUNTER — HOSPITAL ENCOUNTER (OUTPATIENT)
Facility: MEDICAL CENTER | Age: 76
End: 2021-08-12
Attending: SURGERY | Admitting: SURGERY
Payer: MEDICARE

## 2021-08-11 ENCOUNTER — ANESTHESIA (OUTPATIENT)
Dept: SURGERY | Facility: MEDICAL CENTER | Age: 76
End: 2021-08-11
Payer: MEDICARE

## 2021-08-11 DIAGNOSIS — G89.18 ACUTE POSTOPERATIVE PAIN: ICD-10-CM

## 2021-08-11 DIAGNOSIS — Z98.890 STATUS POST REPAIR OF PARAESOPHAGEAL DIAPHRAGMATIC HERNIA: ICD-10-CM

## 2021-08-11 DIAGNOSIS — Z87.19 STATUS POST REPAIR OF PARAESOPHAGEAL DIAPHRAGMATIC HERNIA: ICD-10-CM

## 2021-08-11 PROCEDURE — 700102 HCHG RX REV CODE 250 W/ 637 OVERRIDE(OP): Performed by: SURGERY

## 2021-08-11 PROCEDURE — 500521 HCHG ENDOSTITCH LOAD UNIT: Performed by: SURGERY

## 2021-08-11 PROCEDURE — 96374 THER/PROPH/DIAG INJ IV PUSH: CPT | Mod: XU

## 2021-08-11 PROCEDURE — 700111 HCHG RX REV CODE 636 W/ 250 OVERRIDE (IP): Performed by: ANESTHESIOLOGY

## 2021-08-11 PROCEDURE — 501583 HCHG TROCAR, THRD CAN&SEAL 5X100: Performed by: SURGERY

## 2021-08-11 PROCEDURE — 700105 HCHG RX REV CODE 258: Performed by: SURGERY

## 2021-08-11 PROCEDURE — 160009 HCHG ANES TIME/MIN: Performed by: SURGERY

## 2021-08-11 PROCEDURE — 160035 HCHG PACU - 1ST 60 MINS PHASE I: Performed by: SURGERY

## 2021-08-11 PROCEDURE — C1781 MESH (IMPLANTABLE): HCPCS | Performed by: SURGERY

## 2021-08-11 PROCEDURE — 501577 HCHG TROCAR, STEP 11MM: Performed by: SURGERY

## 2021-08-11 PROCEDURE — 96376 TX/PRO/DX INJ SAME DRUG ADON: CPT | Mod: XU

## 2021-08-11 PROCEDURE — 700111 HCHG RX REV CODE 636 W/ 250 OVERRIDE (IP): Performed by: SURGERY

## 2021-08-11 PROCEDURE — 700101 HCHG RX REV CODE 250: Performed by: SURGERY

## 2021-08-11 PROCEDURE — 160036 HCHG PACU - EA ADDL 30 MINS PHASE I: Performed by: SURGERY

## 2021-08-11 PROCEDURE — 96375 TX/PRO/DX INJ NEW DRUG ADDON: CPT | Mod: XU

## 2021-08-11 PROCEDURE — G0378 HOSPITAL OBSERVATION PER HR: HCPCS

## 2021-08-11 PROCEDURE — 700101 HCHG RX REV CODE 250: Performed by: ANESTHESIOLOGY

## 2021-08-11 PROCEDURE — 160029 HCHG SURGERY MINUTES - 1ST 30 MINS LEVEL 4: Performed by: SURGERY

## 2021-08-11 PROCEDURE — A9270 NON-COVERED ITEM OR SERVICE: HCPCS | Performed by: SURGERY

## 2021-08-11 PROCEDURE — 160002 HCHG RECOVERY MINUTES (STAT): Performed by: SURGERY

## 2021-08-11 PROCEDURE — 501570 HCHG TROCAR, SEPARATOR: Performed by: SURGERY

## 2021-08-11 PROCEDURE — 501664 HCHG TUBING, FILTER STRYKER: Performed by: SURGERY

## 2021-08-11 PROCEDURE — 160048 HCHG OR STATISTICAL LEVEL 1-5: Performed by: SURGERY

## 2021-08-11 PROCEDURE — 160041 HCHG SURGERY MINUTES - EA ADDL 1 MIN LEVEL 4: Performed by: SURGERY

## 2021-08-11 PROCEDURE — 700105 HCHG RX REV CODE 258: Performed by: ANESTHESIOLOGY

## 2021-08-11 PROCEDURE — 500522 HCHG ENDOSTITCH SUTURING DEVICE: Performed by: SURGERY

## 2021-08-11 PROCEDURE — 501838 HCHG SUTURE GENERAL: Performed by: SURGERY

## 2021-08-11 PROCEDURE — 500868 HCHG NEEDLE, SURGI(VARES): Performed by: SURGERY

## 2021-08-11 PROCEDURE — 502571 HCHG PACK, LAP CHOLE: Performed by: SURGERY

## 2021-08-11 DEVICE — MESH SURGICAL PHASIX SEPRA 7X10CM: Type: IMPLANTABLE DEVICE | Status: FUNCTIONAL

## 2021-08-11 RX ORDER — KETOROLAC TROMETHAMINE 30 MG/ML
INJECTION, SOLUTION INTRAMUSCULAR; INTRAVENOUS PRN
Status: DISCONTINUED | OUTPATIENT
Start: 2021-08-11 | End: 2021-08-11 | Stop reason: SURG

## 2021-08-11 RX ORDER — OXYCODONE HYDROCHLORIDE 5 MG/1
10 TABLET ORAL
Status: DISCONTINUED | OUTPATIENT
Start: 2021-08-11 | End: 2021-08-12 | Stop reason: HOSPADM

## 2021-08-11 RX ORDER — HYDROMORPHONE HYDROCHLORIDE 1 MG/ML
0.2 INJECTION, SOLUTION INTRAMUSCULAR; INTRAVENOUS; SUBCUTANEOUS
Status: DISCONTINUED | OUTPATIENT
Start: 2021-08-11 | End: 2021-08-11 | Stop reason: HOSPADM

## 2021-08-11 RX ORDER — OXYCODONE HYDROCHLORIDE 5 MG/1
5 TABLET ORAL
Status: DISCONTINUED | OUTPATIENT
Start: 2021-08-11 | End: 2021-08-12 | Stop reason: HOSPADM

## 2021-08-11 RX ORDER — DEXAMETHASONE SODIUM PHOSPHATE 4 MG/ML
INJECTION, SOLUTION INTRA-ARTICULAR; INTRALESIONAL; INTRAMUSCULAR; INTRAVENOUS; SOFT TISSUE PRN
Status: DISCONTINUED | OUTPATIENT
Start: 2021-08-11 | End: 2021-08-11 | Stop reason: SURG

## 2021-08-11 RX ORDER — ACETAMINOPHEN 500 MG
1000 TABLET ORAL EVERY 6 HOURS PRN
Status: DISCONTINUED | OUTPATIENT
Start: 2021-08-16 | End: 2021-08-12 | Stop reason: HOSPADM

## 2021-08-11 RX ORDER — IBUPROFEN 800 MG/1
800 TABLET ORAL 3 TIMES DAILY PRN
Status: DISCONTINUED | OUTPATIENT
Start: 2021-08-16 | End: 2021-08-12 | Stop reason: HOSPADM

## 2021-08-11 RX ORDER — HALOPERIDOL 5 MG/ML
1 INJECTION INTRAMUSCULAR
Status: DISCONTINUED | OUTPATIENT
Start: 2021-08-11 | End: 2021-08-11 | Stop reason: HOSPADM

## 2021-08-11 RX ORDER — ONDANSETRON 2 MG/ML
INJECTION INTRAMUSCULAR; INTRAVENOUS PRN
Status: DISCONTINUED | OUTPATIENT
Start: 2021-08-11 | End: 2021-08-11 | Stop reason: SURG

## 2021-08-11 RX ORDER — ROCURONIUM BROMIDE 10 MG/ML
INJECTION, SOLUTION INTRAVENOUS PRN
Status: DISCONTINUED | OUTPATIENT
Start: 2021-08-11 | End: 2021-08-11 | Stop reason: SURG

## 2021-08-11 RX ORDER — HYDROMORPHONE HYDROCHLORIDE 1 MG/ML
0.1 INJECTION, SOLUTION INTRAMUSCULAR; INTRAVENOUS; SUBCUTANEOUS
Status: DISCONTINUED | OUTPATIENT
Start: 2021-08-11 | End: 2021-08-11 | Stop reason: HOSPADM

## 2021-08-11 RX ORDER — HALOPERIDOL 5 MG/ML
1 INJECTION INTRAMUSCULAR EVERY 6 HOURS PRN
Status: DISCONTINUED | OUTPATIENT
Start: 2021-08-11 | End: 2021-08-12 | Stop reason: HOSPADM

## 2021-08-11 RX ORDER — IBUPROFEN 800 MG/1
800 TABLET ORAL 3 TIMES DAILY
Status: DISCONTINUED | OUTPATIENT
Start: 2021-08-11 | End: 2021-08-12 | Stop reason: HOSPADM

## 2021-08-11 RX ORDER — LIDOCAINE HYDROCHLORIDE 40 MG/ML
SOLUTION TOPICAL PRN
Status: DISCONTINUED | OUTPATIENT
Start: 2021-08-11 | End: 2021-08-11 | Stop reason: SURG

## 2021-08-11 RX ORDER — CEFAZOLIN SODIUM 1 G/3ML
INJECTION, POWDER, FOR SOLUTION INTRAMUSCULAR; INTRAVENOUS PRN
Status: DISCONTINUED | OUTPATIENT
Start: 2021-08-11 | End: 2021-08-11 | Stop reason: SURG

## 2021-08-11 RX ORDER — ONDANSETRON 2 MG/ML
4 INJECTION INTRAMUSCULAR; INTRAVENOUS EVERY 4 HOURS PRN
Status: DISCONTINUED | OUTPATIENT
Start: 2021-08-11 | End: 2021-08-12 | Stop reason: HOSPADM

## 2021-08-11 RX ORDER — DEXAMETHASONE SODIUM PHOSPHATE 4 MG/ML
4 INJECTION, SOLUTION INTRA-ARTICULAR; INTRALESIONAL; INTRAMUSCULAR; INTRAVENOUS; SOFT TISSUE
Status: COMPLETED | OUTPATIENT
Start: 2021-08-11 | End: 2021-08-11

## 2021-08-11 RX ORDER — OLOPATADINE HYDROCHLORIDE 1 MG/ML
1 SOLUTION/ DROPS OPHTHALMIC
Status: DISCONTINUED | OUTPATIENT
Start: 2021-08-11 | End: 2021-08-11

## 2021-08-11 RX ORDER — ACETAMINOPHEN 500 MG
1000 TABLET ORAL EVERY 6 HOURS
Status: DISCONTINUED | OUTPATIENT
Start: 2021-08-11 | End: 2021-08-12 | Stop reason: HOSPADM

## 2021-08-11 RX ORDER — HYDROMORPHONE HYDROCHLORIDE 1 MG/ML
0.5 INJECTION, SOLUTION INTRAMUSCULAR; INTRAVENOUS; SUBCUTANEOUS
Status: DISCONTINUED | OUTPATIENT
Start: 2021-08-11 | End: 2021-08-12 | Stop reason: HOSPADM

## 2021-08-11 RX ORDER — HYDROMORPHONE HYDROCHLORIDE 2 MG/ML
INJECTION, SOLUTION INTRAMUSCULAR; INTRAVENOUS; SUBCUTANEOUS PRN
Status: DISCONTINUED | OUTPATIENT
Start: 2021-08-11 | End: 2021-08-11 | Stop reason: SURG

## 2021-08-11 RX ORDER — ACETAMINOPHEN 500 MG
250 TABLET ORAL
COMMUNITY

## 2021-08-11 RX ORDER — OLOPATADINE HYDROCHLORIDE 1 MG/ML
1 SOLUTION/ DROPS OPHTHALMIC
COMMUNITY

## 2021-08-11 RX ORDER — ONDANSETRON 2 MG/ML
4 INJECTION INTRAMUSCULAR; INTRAVENOUS
Status: COMPLETED | OUTPATIENT
Start: 2021-08-11 | End: 2021-08-11

## 2021-08-11 RX ORDER — OXYCODONE HCL 5 MG/5 ML
5 SOLUTION, ORAL ORAL
Status: DISCONTINUED | OUTPATIENT
Start: 2021-08-11 | End: 2021-08-11 | Stop reason: HOSPADM

## 2021-08-11 RX ORDER — BUPIVACAINE HYDROCHLORIDE AND EPINEPHRINE 5; 5 MG/ML; UG/ML
INJECTION, SOLUTION EPIDURAL; INTRACAUDAL; PERINEURAL
Status: DISCONTINUED | OUTPATIENT
Start: 2021-08-11 | End: 2021-08-11 | Stop reason: HOSPADM

## 2021-08-11 RX ORDER — SCOLOPAMINE TRANSDERMAL SYSTEM 1 MG/1
1 PATCH, EXTENDED RELEASE TRANSDERMAL
Status: DISCONTINUED | OUTPATIENT
Start: 2021-08-11 | End: 2021-08-12 | Stop reason: HOSPADM

## 2021-08-11 RX ORDER — DEXTROSE MONOHYDRATE, SODIUM CHLORIDE, AND POTASSIUM CHLORIDE 50; 1.49; 4.5 G/1000ML; G/1000ML; G/1000ML
INJECTION, SOLUTION INTRAVENOUS CONTINUOUS
Status: DISCONTINUED | OUTPATIENT
Start: 2021-08-11 | End: 2021-08-12

## 2021-08-11 RX ORDER — DEXMEDETOMIDINE HYDROCHLORIDE 100 UG/ML
INJECTION, SOLUTION INTRAVENOUS PRN
Status: DISCONTINUED | OUTPATIENT
Start: 2021-08-11 | End: 2021-08-11 | Stop reason: HOSPADM

## 2021-08-11 RX ORDER — SODIUM CHLORIDE, SODIUM LACTATE, POTASSIUM CHLORIDE, CALCIUM CHLORIDE 600; 310; 30; 20 MG/100ML; MG/100ML; MG/100ML; MG/100ML
INJECTION, SOLUTION INTRAVENOUS CONTINUOUS
Status: ACTIVE | OUTPATIENT
Start: 2021-08-11 | End: 2021-08-11

## 2021-08-11 RX ORDER — HYDROMORPHONE HYDROCHLORIDE 1 MG/ML
0.4 INJECTION, SOLUTION INTRAMUSCULAR; INTRAVENOUS; SUBCUTANEOUS
Status: DISCONTINUED | OUTPATIENT
Start: 2021-08-11 | End: 2021-08-11 | Stop reason: HOSPADM

## 2021-08-11 RX ORDER — PHENYLEPHRINE HCL IN 0.9% NACL 0.5 MG/5ML
SYRINGE (ML) INTRAVENOUS PRN
Status: DISCONTINUED | OUTPATIENT
Start: 2021-08-11 | End: 2021-08-11 | Stop reason: HOSPADM

## 2021-08-11 RX ORDER — OXYCODONE HCL 5 MG/5 ML
10 SOLUTION, ORAL ORAL
Status: DISCONTINUED | OUTPATIENT
Start: 2021-08-11 | End: 2021-08-11 | Stop reason: HOSPADM

## 2021-08-11 RX ADMIN — DEXAMETHASONE SODIUM PHOSPHATE 4 MG: 4 INJECTION, SOLUTION INTRA-ARTICULAR; INTRALESIONAL; INTRAMUSCULAR; INTRAVENOUS; SOFT TISSUE at 07:32

## 2021-08-11 RX ADMIN — PROPOFOL 120 MG: 10 INJECTION, EMULSION INTRAVENOUS at 07:30

## 2021-08-11 RX ADMIN — SODIUM CHLORIDE, POTASSIUM CHLORIDE, SODIUM LACTATE AND CALCIUM CHLORIDE: 600; 310; 30; 20 INJECTION, SOLUTION INTRAVENOUS at 09:28

## 2021-08-11 RX ADMIN — ROCURONIUM BROMIDE 50 MG: 10 INJECTION, SOLUTION INTRAVENOUS at 07:30

## 2021-08-11 RX ADMIN — ONDANSETRON 4 MG: 2 INJECTION INTRAMUSCULAR; INTRAVENOUS at 15:52

## 2021-08-11 RX ADMIN — ROCURONIUM BROMIDE 20 MG: 10 INJECTION, SOLUTION INTRAVENOUS at 09:03

## 2021-08-11 RX ADMIN — DEXMEDETOMIDINE 20 MCG: 200 INJECTION, SOLUTION INTRAVENOUS at 09:04

## 2021-08-11 RX ADMIN — HYDROMORPHONE HYDROCHLORIDE 2 MG: 2 INJECTION, SOLUTION INTRAMUSCULAR; INTRAVENOUS; SUBCUTANEOUS at 07:26

## 2021-08-11 RX ADMIN — CEFAZOLIN 2 G: 330 INJECTION, POWDER, FOR SOLUTION INTRAMUSCULAR; INTRAVENOUS at 07:30

## 2021-08-11 RX ADMIN — PHENYLEPHRINE HYDROCHLORIDE 40 MCG/MIN: 10 INJECTION INTRAVENOUS at 07:37

## 2021-08-11 RX ADMIN — OXYCODONE 5 MG: 5 TABLET ORAL at 21:02

## 2021-08-11 RX ADMIN — LIDOCAINE HYDROCHLORIDE 4 ML: 40 SOLUTION TOPICAL at 07:31

## 2021-08-11 RX ADMIN — DEXAMETHASONE SODIUM PHOSPHATE 4 MG: 4 INJECTION, SOLUTION INTRA-ARTICULAR; INTRALESIONAL; INTRAMUSCULAR; INTRAVENOUS; SOFT TISSUE at 22:32

## 2021-08-11 RX ADMIN — SODIUM CHLORIDE, POTASSIUM CHLORIDE, SODIUM LACTATE AND CALCIUM CHLORIDE: 600; 310; 30; 20 INJECTION, SOLUTION INTRAVENOUS at 06:24

## 2021-08-11 RX ADMIN — PROPOFOL 30 MG: 10 INJECTION, EMULSION INTRAVENOUS at 07:35

## 2021-08-11 RX ADMIN — SUGAMMADEX 200 MG: 100 INJECTION, SOLUTION INTRAVENOUS at 09:21

## 2021-08-11 RX ADMIN — ONDANSETRON 4 MG: 2 INJECTION INTRAMUSCULAR; INTRAVENOUS at 09:20

## 2021-08-11 RX ADMIN — ROCURONIUM BROMIDE 20 MG: 10 INJECTION, SOLUTION INTRAVENOUS at 08:35

## 2021-08-11 RX ADMIN — KETOROLAC TROMETHAMINE 15 MG: 30 INJECTION, SOLUTION INTRAMUSCULAR at 09:20

## 2021-08-11 RX ADMIN — Medication 200 MCG: at 07:37

## 2021-08-11 RX ADMIN — PROPOFOL 50 MG: 10 INJECTION, EMULSION INTRAVENOUS at 07:41

## 2021-08-11 RX ADMIN — ONDANSETRON 4 MG: 2 INJECTION INTRAMUSCULAR; INTRAVENOUS at 14:13

## 2021-08-11 ASSESSMENT — PAIN DESCRIPTION - PAIN TYPE
TYPE: SURGICAL PAIN

## 2021-08-11 ASSESSMENT — LIFESTYLE VARIABLES
TOTAL SCORE: 0
ALCOHOL_USE: NO
TOTAL SCORE: 0
HAVE PEOPLE ANNOYED YOU BY CRITICIZING YOUR DRINKING: NO
AVERAGE NUMBER OF DAYS PER WEEK YOU HAVE A DRINK CONTAINING ALCOHOL: 0
ON A TYPICAL DAY WHEN YOU DRINK ALCOHOL HOW MANY DRINKS DO YOU HAVE: 0
TOTAL SCORE: 0
EVER HAD A DRINK FIRST THING IN THE MORNING TO STEADY YOUR NERVES TO GET RID OF A HANGOVER: NO
CONSUMPTION TOTAL: NEGATIVE
HAVE YOU EVER FELT YOU SHOULD CUT DOWN ON YOUR DRINKING: NO
EVER FELT BAD OR GUILTY ABOUT YOUR DRINKING: NO
DOES PATIENT WANT TO STOP DRINKING: NO
HOW MANY TIMES IN THE PAST YEAR HAVE YOU HAD 5 OR MORE DRINKS IN A DAY: 0

## 2021-08-11 ASSESSMENT — COGNITIVE AND FUNCTIONAL STATUS - GENERAL
TURNING FROM BACK TO SIDE WHILE IN FLAT BAD: A LITTLE
DAILY ACTIVITIY SCORE: 20
SUGGESTED CMS G CODE MODIFIER DAILY ACTIVITY: CJ
MOBILITY SCORE: 18
MOVING TO AND FROM BED TO CHAIR: A LITTLE
MOVING FROM LYING ON BACK TO SITTING ON SIDE OF FLAT BED: A LITTLE
CLIMB 3 TO 5 STEPS WITH RAILING: A LITTLE
DRESSING REGULAR UPPER BODY CLOTHING: A LITTLE
TOILETING: A LITTLE
STANDING UP FROM CHAIR USING ARMS: A LITTLE
WALKING IN HOSPITAL ROOM: A LITTLE
SUGGESTED CMS G CODE MODIFIER MOBILITY: CK
DRESSING REGULAR LOWER BODY CLOTHING: A LITTLE
HELP NEEDED FOR BATHING: A LITTLE

## 2021-08-11 ASSESSMENT — PAIN SCALES - GENERAL: PAIN_LEVEL: 0

## 2021-08-11 ASSESSMENT — PATIENT HEALTH QUESTIONNAIRE - PHQ9
2. FEELING DOWN, DEPRESSED, IRRITABLE, OR HOPELESS: NOT AT ALL
SUM OF ALL RESPONSES TO PHQ9 QUESTIONS 1 AND 2: 0
1. LITTLE INTEREST OR PLEASURE IN DOING THINGS: NOT AT ALL
SUM OF ALL RESPONSES TO PHQ9 QUESTIONS 1 AND 2: 0
1. LITTLE INTEREST OR PLEASURE IN DOING THINGS: NOT AT ALL
2. FEELING DOWN, DEPRESSED, IRRITABLE, OR HOPELESS: NOT AT ALL

## 2021-08-11 ASSESSMENT — FIBROSIS 4 INDEX: FIB4 SCORE: 2.23

## 2021-08-11 NOTE — PROGRESS NOTES
"Pt Shelby Mireles admitted to room T404-2 via transport in [facility] from PACU at 1600.  Pt /65   Pulse 71   Temp 36.4 °C (97.6 °F) (Temporal)   Resp 17   Ht 1.6 m (5' 3\")   Wt 67.5 kg (148 lb 13 oz)   SpO2 98%   BMI 26.36 kg/m²    pain reported at 1 on a scale of 0-10. Oriented to room call light and smoking policy.  Reviewed plan of care (equipment, incentive spirometer, sequential compression devices, medications, activity, diet, fall precautions, skin care, and pain) with patient and family. Welcome packet given and reviewed with patient , all questions answered. Education provided on oral hygiene program.     2 RN skin check complete.   Devices in place SCD's bilaterally, SpO2 finger probe, PIV line.  Skin assessed under devices     The following interventions in place   incision - 5 lap siteswell approximated with DERMABOND, no evidence of opening areas.  Skin beneath folds checked,     Preventative measures in place:  - use of pillows to support repositioning  -heels floated on pillows  -bony prominences floated on pillows  -clear liquids   -mobilization  -repositioning of devices            "

## 2021-08-11 NOTE — ANESTHESIA POSTPROCEDURE EVALUATION
Patient: Shelby Mireles    Procedure Summary     Date: 08/11/21 Room / Location: [place] [id number] / SURGERY [facility]    Anesthesia Start: 0725 Anesthesia Stop: 0933    Procedure: FUNDOPLICATION, PARTIAL, LAPAROSCOPIC - PARAESOPHAGEAL WITH MESH. (N/A Abdomen) Diagnosis: (PARAESOPHAGEAL HIATAL HERNIA)    Surgeons: Noble Arce M.D. Responsible Provider: Sara Nelson M.D.    Anesthesia Type: general ASA Status: 2          Final Anesthesia Type: general  Last vitals  BP   Blood Pressure : 116/64    Temp   36.5 °C (97.7 °F)    Pulse   62   Resp   13    SpO2   92 %      Anesthesia Post Evaluation    Patient location during evaluation: PACU  Patient participation: complete - patient participated  Level of consciousness: awake and alert  Pain score: 0    Airway patency: patent  Anesthetic complications: no  Cardiovascular status: hemodynamically stable  Respiratory status: acceptable  Hydration status: euvolemic    PONV: none          There were no known complications for this encounter.

## 2021-08-11 NOTE — ANESTHESIA PROCEDURE NOTES
Airway    Date/Time: 8/11/2021 7:31 AM  Performed by: Sara Nelson M.D.  Authorized by: Sara Nelson M.D.     Location:  OR  Urgency:  Elective  Difficult Airway: No    Indications for Airway Management:  Anesthesia      Spontaneous Ventilation: absent    Sedation Level:  Deep  Preoxygenated: Yes    Patient Position:  Sniffing  Mask Difficulty Assessment:  0 - not attempted  Final Airway Type:  Endotracheal airway  Final Endotracheal Airway:  ETT  Cuffed: Yes    Technique Used for Successful ETT Placement:  Direct laryngoscopy  Devices/Methods Used in Placement:  Intubating stylet    Insertion Site:  Oral  Blade Type:  Vi  Laryngoscope Blade/Videolaryngoscope Blade Size:  3  ETT Size (mm):  6.5  Leak Pressue (cm H2O):  35  Measured from:  Teeth  ETT to Teeth (cm):  21  Placement Verified by: auscultation and capnometry    Cormack-Lehane Classification:  Grade I - full view of glottis  Number of Attempts at Approach:  1

## 2021-08-11 NOTE — OR NURSING
Arrived to PACU in stable condition. Oral airway removed and O2 placed at 10L for ERAS protocol. Taking sips of clear liquids with no nausea.  updated and belongings that include one clear bag with her glasses are on her bed. Denies pain at this time.     1310 Doing well in recovery and resting. Denies pain or nausea.  updated again.     1454 Report called and  updated. Transferred to floor on 10L Oxymask for ERAS, she has had juice as well. Transferred to floor with transport and 02 tank @ 100%

## 2021-08-11 NOTE — ANESTHESIA PREPROCEDURE EVALUATION
"76F w/hiatal hernia here for Nissen fundoplication    Allergies   Allergen Reactions   • Tetracycline      Pt states she got a rash      Relevant Problems   ANESTHESIA (within normal limits)      PULMONARY   (positive) SOB (shortness of breath)      CARDIAC (within normal limits)      GI   (positive) Hiatal hernia     Past Medical History:   Diagnosis Date   • Arthritis     Hips, feet   • Breath shortness     Since April 2021   • Cataract     Surgery bilat   • Heart burn    • High cholesterol    • Indigestion     GERD      No current facility-administered medications on file prior to encounter.     Current Outpatient Medications on File Prior to Encounter   Medication Sig Dispense Refill   • acetaminophen (TYLENOL) 500 MG Tab Take 250 mg by mouth 1 time a day as needed. Indications: Pain     • olopatadine (PATADAY) 0.1 % ophthalmic solution Administer 1 Drop into both eyes 1 time a day as needed for Allergies.     • Glycerin-Polysorbate 80 (REFRESH DRY EYE THERAPY OP) Administer 1 Drop into both eyes 1 time a day as needed.     • esomeprazole (NEXIUM 24HR) 20 MG capsule Take 20 mg by mouth every day.        Past Surgical History:   Procedure Laterality Date   • OTHER  1978    Breast implants   • GYN SURGERY  1976    Tubal ligation   • TONSILLECTOMY  1947      Vitals:    07/26/21 1436 08/11/21 0600 08/11/21 0623   BP:  (!) 162/86 145/78   Pulse:  69    Resp:  16    Temp:  36.3 °C (97.3 °F)    TempSrc:  Temporal    SpO2:  97%    Weight: 67.5 kg (148 lb 13 oz) 67.5 kg (148 lb 13 oz)    Height: 1.6 m (5' 3\") 1.6 m (5' 3\")       Physical Exam    Airway   Mallampati: III  TM distance: >3 FB  Neck ROM: limited       Cardiovascular - normal exam     Dental - normal exam           Pulmonary - normal exam     Abdominal    Neurological - normal exam                 Anesthesia Plan    ASA 2       Plan - general       Airway plan will be ETT        Plan Factors:   Patient was previously instructed to abstain from smoking on day " of procedure.  Patient did not smoke on day of procedure.      Induction: intravenous    Postoperative Plan: Postoperative administration of opioids is intended.    Pertinent diagnostic labs and testing reviewed    Informed Consent:    Anesthetic plan and risks discussed with patient.

## 2021-08-11 NOTE — ANESTHESIA TIME REPORT
Anesthesia Start and Stop Event Times     Date Time Event    8/11/2021 0720 Ready for Procedure     0725 Anesthesia Start     0933 Anesthesia Stop        Responsible Staff  08/11/21    Name Role Begin End    Sara Nelson M.D. Anesth 0725 0933        Preop Diagnosis (Free Text):  Pre-op Diagnosis     PARAESOPHAGEAL HIATAL HERNIA        Preop Diagnosis (Codes):    Post op Diagnosis  Paraesophageal hernia      Premium Reason  Non-Premium    Comments:

## 2021-08-11 NOTE — NON-PROVIDER
COVID-19 Pre-surgery screening:  ?  1. Do you have an undiagnosed respiratory illness or symptoms such as coughing or sneezing, SOB, loss of taste or smell? (No)      2. Do you have an unexplained fever greater than 100.4 degrees Fahrenheit or 38 degrees Celsius? (No)     3. Have you had direct exposure to a patient who tested positive for Covid-19? (No)  ?  4. Have you traveled within the last 14 days? (No)  ?  Informed of visitor policy and mask use requirement-Yes

## 2021-08-11 NOTE — CARE PLAN
The patient is Stable - Low risk of patient condition declining or worsening         Progress made toward(s) clinical / shift goals:    Problem: Knowledge Deficit - Standard  Goal: Patient and family/care givers will demonstrate understanding of plan of care, disease process/condition, diagnostic tests and medications  Outcome: Progressing   Patient educated on MD orders all questions answered.     Patient is not progressing towards the following goals:

## 2021-08-11 NOTE — PROGRESS NOTES
Med rec complete per pt at bedside  Interviewed pt with family at bedside with permission from pt  Allergies reviewed and updated.       no

## 2021-08-12 VITALS
RESPIRATION RATE: 22 BRPM | DIASTOLIC BLOOD PRESSURE: 73 MMHG | TEMPERATURE: 99.5 F | OXYGEN SATURATION: 91 % | HEIGHT: 63 IN | BODY MASS INDEX: 26.37 KG/M2 | WEIGHT: 148.81 LBS | HEART RATE: 80 BPM | SYSTOLIC BLOOD PRESSURE: 115 MMHG

## 2021-08-12 PROBLEM — Z87.19 STATUS POST REPAIR OF PARAESOPHAGEAL DIAPHRAGMATIC HERNIA: Status: ACTIVE | Noted: 2021-08-12

## 2021-08-12 PROBLEM — Z98.890 STATUS POST REPAIR OF PARAESOPHAGEAL DIAPHRAGMATIC HERNIA: Status: ACTIVE | Noted: 2021-08-12

## 2021-08-12 PROBLEM — Z87.19 STATUS POST REPAIR OF PARAESOPHAGEAL DIAPHRAGMATIC HERNIA: Status: RESOLVED | Noted: 2021-08-12 | Resolved: 2021-08-12

## 2021-08-12 PROBLEM — Z98.890 STATUS POST REPAIR OF PARAESOPHAGEAL DIAPHRAGMATIC HERNIA: Status: RESOLVED | Noted: 2021-08-12 | Resolved: 2021-08-12

## 2021-08-12 PROCEDURE — A9270 NON-COVERED ITEM OR SERVICE: HCPCS | Performed by: SURGERY

## 2021-08-12 PROCEDURE — 700111 HCHG RX REV CODE 636 W/ 250 OVERRIDE (IP): Performed by: SURGERY

## 2021-08-12 PROCEDURE — 96372 THER/PROPH/DIAG INJ SC/IM: CPT

## 2021-08-12 PROCEDURE — 700102 HCHG RX REV CODE 250 W/ 637 OVERRIDE(OP): Performed by: SURGERY

## 2021-08-12 PROCEDURE — G0378 HOSPITAL OBSERVATION PER HR: HCPCS

## 2021-08-12 PROCEDURE — 96376 TX/PRO/DX INJ SAME DRUG ADON: CPT

## 2021-08-12 RX ORDER — IBUPROFEN 800 MG/1
800 TABLET ORAL
Qty: 21 TABLET | Refills: 0 | Status: SHIPPED | OUTPATIENT
Start: 2021-08-12 | End: 2021-08-19

## 2021-08-12 RX ORDER — ACETAMINOPHEN 500 MG
1000 TABLET ORAL EVERY 6 HOURS
Qty: 56 TABLET | Refills: 0 | Status: SHIPPED | OUTPATIENT
Start: 2021-08-12 | End: 2021-08-19

## 2021-08-12 RX ORDER — OXYCODONE HYDROCHLORIDE 5 MG/1
5 TABLET ORAL EVERY 6 HOURS PRN
Qty: 12 TABLET | Refills: 0 | Status: SHIPPED | OUTPATIENT
Start: 2021-08-12 | End: 2021-08-15

## 2021-08-12 RX ADMIN — ACETAMINOPHEN 1000 MG: 500 TABLET ORAL at 13:11

## 2021-08-12 RX ADMIN — ONDANSETRON 4 MG: 2 INJECTION INTRAMUSCULAR; INTRAVENOUS at 06:28

## 2021-08-12 RX ADMIN — IBUPROFEN 800 MG: 200 TABLET, FILM COATED ORAL at 13:11

## 2021-08-12 RX ADMIN — ACETAMINOPHEN 1000 MG: 500 TABLET ORAL at 06:23

## 2021-08-12 RX ADMIN — ENOXAPARIN SODIUM 40 MG: 40 INJECTION SUBCUTANEOUS at 06:23

## 2021-08-12 ASSESSMENT — PAIN DESCRIPTION - PAIN TYPE
TYPE: ACUTE PAIN
TYPE: SURGICAL PAIN

## 2021-08-12 NOTE — PROGRESS NOTES
Received report from night shift RN and assumed care of patient (pt). Pt is A&O x 4. Pt reports no pain during assessment. Pt is stable on 1L O2. Pt has no questions regarding plan of care, concerns, or signs of distress at this time. Bed is in lowest, locked position, call light and belongings are within reach. Pt calls for assistance and bed alarm is off.  All other needs met.

## 2021-08-12 NOTE — DISCHARGE INSTRUCTIONS
D/C instructions:    1. DIET: Full liquid.  Upon discharge from the hospital you may continue advancing diet per the previously provided instruction sheet.    2. ACTIVITIES: After discharge from the hospital, you may resume full routine activities, as tolerated    3. DRIVING: You may drive whenever you are off pain medications and are able to perform the activities needed to drive, i.e. turning, bending, twisting, etc.    4. BATHING: You may get the wound wet at any time after leaving the hospital. You may shower, but do not submerge in a bath for at least a week.     5. BOWEL FUNCTION: A few patients, after this operation, will develop either frequent or loose stools after meals. This usually corrects itself after a few days, to a few weeks. If this occurs, do not worry; it is not unusual and will resolve. Much more common than loose stools, is constipation. The combination of pain medication and decreased activity level can cause constipation in otherwise normal patients. If you feel this is occurring, take a laxative (Milk of Magnesia, Ex-Lax, Senokot, etc.) until the problem has resolved.    6. PAIN MEDICATION: You will be given a prescription for pain medication at discharge. Please take these as directed. It is important to remember not to take medications on an empty stomach as this may cause nausea.    7. CALL IF YOU HAVE: (1) Fevers to more than 101.5 degrees F, (2) Unusual chest or leg pain, (3) Drainage or fluid from incision that may be foul smelling, increased tenderness or soreness at the wound or the wound edges are no longer together, redness or swelling at the incision site. Please do not hesitate to call with any other questions.     8. APPOINTMENT: Contact our office at [phone] for a follow-up appointment in 1 to 2 weeks following your procedure.    If you have any additional questions, please do not hesitate to call the office and speak to either myself or the physician on call.    Office  address:   [address] [address]      Noble Arce M.D.  [facility]  [phone]        Discharge Instructions    Discharged to home by car with relative. Discharged via wheelchair, hospital escort: Yes.  Special equipment needed: Not Applicable    Be sure to schedule a follow-up appointment with your primary care doctor or any specialists as instructed.     Discharge Plan:   Diet Plan: Discussed  Activity Level: Discussed  Confirmed Follow up Appointment: Appointment Scheduled  Confirmed Symptoms Management: Discussed  Medication Reconciliation Updated: Yes    I understand that a diet low in cholesterol, fat, and sodium is recommended for good health. Unless I have been given specific instructions below for another diet, I accept this instruction as my diet prescription.   Other diet: Regular diet    Special Instructions:  Hernia, Adult         A hernia happens when tissue inside your body pushes out through a weak spot in your belly muscles (abdominal wall). This makes a round lump (bulge). The lump may be:  · In a scar from surgery that was done in your belly (incisional hernia).  · Near your belly button (umbilical hernia).  · In your groin (inguinal hernia). Your groin is the area where your leg meets your lower belly (abdomen). This kind of hernia could also be:  ? In your scrotum, if you are male.  ? In folds of skin around your vagina, if you are female.  · In your upper thigh (femoral hernia).  · Inside your belly (hiatal hernia). This happens when your stomach slides above the muscle between your belly and your chest (diaphragm).  If your hernia is small and it does not cause pain, you may not need treatment. If your hernia is large or it causes pain, you may need surgery.  Follow these instructions at home:  Activity  · Avoid stretching or overusing (straining) the muscles near your hernia. Straining can happen when you:  ? Lift something heavy.  ? Poop (have a bowel  movement).  · Do not lift anything that is heavier than 10 lb (4.5 kg), or the limit that you are told, until your doctor says that it is safe.  · Use the strength of your legs when you lift something heavy. Do not use only your back muscles to lift.  General instructions  · Do these things if told by your doctor so you do not have trouble pooping (constipation):  ? Drink enough fluid to keep your pee (urine) pale yellow.  ? Eat foods that are high in fiber. These include fresh fruits and vegetables, whole grains, and beans.  ? Limit foods that are high in fat and processed sugars. These include foods that are fried or sweet.  ? Take medicine for trouble pooping.  · When you cough, try to cough gently.  · You may try to push your hernia in by very gently pressing on it when you are lying down. Do not try to force the bulge back in if it will not push in easily.  · If you are overweight, work with your doctor to lose weight safely.  · Do not use any products that have nicotine or tobacco in them. These include cigarettes and e-cigarettes. If you need help quitting, ask your doctor.  · If you will be having surgery (hernia repair), watch your hernia for changes in shape, size, or color. Tell your doctor if you see any changes.  · Take over-the-counter and prescription medicines only as told by your doctor.  · Keep all follow-up visits as told by your doctor.  Contact a doctor if:  · You get new pain, swelling, or redness near your hernia.  · You poop fewer times in a week than normal.  · You have trouble pooping.  · You have poop (stool) that is more dry than normal.  · You have poop that is harder or larger than normal.  Get help right away if:  · You have a fever.  · You have belly pain that gets worse.  · You feel sick to your stomach (nauseous).  · You throw up (vomit).  · Your hernia cannot be pushed in by very gently pressing on it when you are lying down. Do not try to force the bulge back in if it will not  push in easily.  · Your hernia:  ? Changes in shape or size.  ? Changes color.  ? Feels hard or it hurts when you touch it.  These symptoms may represent a serious problem that is an emergency. Do not wait to see if the symptoms will go away. Get medical help right away. Call your local emergency services (911 in the [place]  Summary  · A hernia happens when tissue inside your body pushes out through a weak spot in the belly muscles. This creates a bulge.  · If your hernia is small and it does not hurt, you may not need treatment. If your hernia is large or it hurts, you may need surgery.  · If you will be having surgery, watch your hernia for changes in shape, size, or color. Tell your doctor about any changes.  This information is not intended to replace advice given to you by your health care provider. Make sure you discuss any questions you have with your health care provider.  Document Released: 06/07/2011 Document Revised: 04/09/2020 Document Reviewed: 09/19/2018  [employer] Patient Education © 2020 [employer]      · Is patient discharged on Warfarin / Coumadin?   No     Depression / Suicide Risk    As you are discharged from this [facility] facility, it is important to learn how to keep safe from harming yourself.    Recognize the warning signs:  · Abrupt changes in personality, positive or negative- including increase in energy   · Giving away possessions  · Change in eating patterns- significant weight changes-  positive or negative  · Change in sleeping patterns- unable to sleep or sleeping all the time   · Unwillingness or inability to communicate  · Depression  · Unusual sadness, discouragement and loneliness  · Talk of wanting to die  · Neglect of personal appearance   · Rebelliousness- reckless behavior  · Withdrawal from people/activities they love  · Confusion- inability to concentrate     If you or a loved one observes any of these behaviors or has concerns about self-harm, here's what you can  do:  · Talk about it- your feelings and reasons for harming yourself  · Remove any means that you might use to hurt yourself (examples: pills, rope, extension cords, firearm)  · Get professional help from the community (Mental Health, Substance Abuse, psychological counseling)  · Do not be alone:Call your Safe Contact- someone whom you trust who will be there for you.  · Call your local CRISIS HOTLINE 543-7380 or 814-648-6232  · Call your local Children's Mobile Crisis Response Team [place] (749) 148-5948 or www.[place]  · Call the toll free National Suicide Prevention Hotlines   · National Suicide Prevention Lifeline 159-922-XFWZ (0127)  · National Hope Line Network 800-SUICIDE (995-1565)

## 2021-08-12 NOTE — CARE PLAN
Problem: Knowledge Deficit - Standard  Goal: Patient and family/care givers will demonstrate understanding of plan of care, disease process/condition, diagnostic tests and medications  Outcome: Progressing     Problem: Pain - Standard  Goal: Alleviation of pain or a reduction in pain to the patient’s comfort goal  Outcome: Progressing  PRN pain med in use     The patient is Watcher - Medium risk of patient condition declining or worsening    Shift Goals  Clinical Goals: pain control; incerase ambulation without pain  Patient Goals: pain control    Progress made toward(s) clinical / shift goals:  yes  Pt has ambulated this shift with standby assist.

## 2021-08-12 NOTE — PROGRESS NOTES
Discharge paperwork discussed with the patient, signed copy placed in the chart along with controlled substance consent.. PIV d/c. Pt ambulated to [facility] for discharge.

## 2021-08-12 NOTE — DISCHARGE SUMMARY
Discharge Summary    CHIEF COMPLAINT ON ADMISSION  No chief complaint on file.      Reason for Admission  PARAESOPHAGEAL HIATAL HERNIA     Admission Date  8/11/2021    CODE STATUS  Full Code    HPI & HOSPITAL COURSE  This is a 76 y.o. female s/p paraesophageal hernia repair, which they tolerated well.   At time of discharge the patient was tolerating a liquid diet, voiding, ambulating, and pain was controlled on oral medication.  No heartburn or dysphagia noted.    Exam at time of discharge:  NAD, awake, alert  Breathing nonlabored  abdominal incisions c/d/i     No notes on file    Therefore, she is discharged in good and stable condition to home with close outpatient follow-up.    Discharge Date  8/12/2021    DISCHARGE DIAGNOSES  Active Problems:    * No active hospital problems. *  Resolved Problems:    Status post repair of paraesophageal diaphragmatic hernia POA: Unknown      FOLLOW UP  Noble Arce M.D.  [facility]  [phone]  2 weeks    MEDICATIONS ON DISCHARGE     Medication List      START taking these medications      Instructions   ibuprofen 800 MG Tabs  Commonly known as: MOTRIN   Take 1 Tablet by mouth 3 times a day with meals for 7 days.  Dose: 800 mg     oxyCODONE immediate-release 5 MG Tabs  Commonly known as: ROXICODONE   Take 1 Tablet by mouth every 6 hours as needed for up to 3 days.  Dose: 5 mg        CHANGE how you take these medications      Instructions   * acetaminophen 500 MG Tabs  What changed: Another medication with the same name was added. Make sure you understand how and when to take each.  Commonly known as: TYLENOL   Take 250 mg by mouth 1 time a day as needed. Indications: Pain  Dose: 250 mg     * acetaminophen 500 MG Tabs  What changed: You were already taking a medication with the same name, and this prescription was added. Make sure you understand how and when to take each.  Commonly known as: TYLENOL   Take 2 Tablets by mouth every 6 hours for 7 days.  Dose: 1,000  mg         * This list has 2 medication(s) that are the same as other medications prescribed for you. Read the directions carefully, and ask your doctor or other care provider to review them with you.            CONTINUE taking these medications      Instructions   NexIUM 24HR 20 MG capsule  Generic drug: esomeprazole   Take 20 mg by mouth every day.  Dose: 20 mg     Pataday 0.1 % ophthalmic solution  Generic drug: olopatadine   Administer 1 Drop into both eyes 1 time a day as needed for Allergies.  Dose: 1 Drop     REFRESH DRY EYE THERAPY OP   Administer 1 Drop into both eyes 1 time a day as needed.  Dose: 1 Drop            Allergies  Allergies   Allergen Reactions   • Tetracycline      Pt states she got a rash       DIET  Orders Placed This Encounter   Procedures   • Diet Order Diet: Full Liquid (NO CARBONATED BEVERAGES)     Standing Status:   Standing     Number of Occurrences:   1     Order Specific Question:   Diet:     Answer:   Full Liquid [11]     Comments:   NO CARBONATED BEVERAGES       ACTIVITY  As tolerated.  Weight bearing as tolerated    CONSULTATIONS  none    PROCEDURES  8/11/21  1.  LAPAROSCOPIC PARAESOPHAGEAL HIATAL HERNIA REPAIR WITH MESH, PARTIAL FUNDOPLICATION - Wound Class: Clean    LABORATORY  Lab Results   Component Value Date    SODIUM 140 07/26/2021    POTASSIUM 4.1 07/26/2021    CHLORIDE 104 07/26/2021    CO2 24 07/26/2021    GLUCOSE 85 07/26/2021    BUN 18 07/26/2021    CREATININE 0.59 07/26/2021        Lab Results   Component Value Date    WBC 4.9 07/26/2021    HEMOGLOBIN 15.5 07/26/2021    HEMATOCRIT 45.5 07/26/2021    PLATELETCT 182 07/26/2021        Total time of the discharge process exceeds 15 minutes.

## 2021-09-13 ENCOUNTER — TELEPHONE (OUTPATIENT)
Dept: SCHEDULING | Facility: IMAGING CENTER | Age: 76
End: 2021-09-13

## 2021-09-24 SDOH — HEALTH STABILITY: PHYSICAL HEALTH: ON AVERAGE, HOW MANY MINUTES DO YOU ENGAGE IN EXERCISE AT THIS LEVEL?: 80 MIN

## 2021-09-24 SDOH — ECONOMIC STABILITY: HOUSING INSECURITY: IN THE LAST 12 MONTHS, HOW MANY PLACES HAVE YOU LIVED?: 1

## 2021-09-24 SDOH — ECONOMIC STABILITY: FOOD INSECURITY: WITHIN THE PAST 12 MONTHS, THE FOOD YOU BOUGHT JUST DIDN'T LAST AND YOU DIDN'T HAVE MONEY TO GET MORE.: NEVER TRUE

## 2021-09-24 SDOH — ECONOMIC STABILITY: HOUSING INSECURITY
IN THE LAST 12 MONTHS, WAS THERE A TIME WHEN YOU DID NOT HAVE A STEADY PLACE TO SLEEP OR SLEPT IN A SHELTER (INCLUDING NOW)?: NO

## 2021-09-24 SDOH — ECONOMIC STABILITY: TRANSPORTATION INSECURITY
IN THE PAST 12 MONTHS, HAS THE LACK OF TRANSPORTATION KEPT YOU FROM MEDICAL APPOINTMENTS OR FROM GETTING MEDICATIONS?: NO

## 2021-09-24 SDOH — HEALTH STABILITY: PHYSICAL HEALTH: ON AVERAGE, HOW MANY DAYS PER WEEK DO YOU ENGAGE IN MODERATE TO STRENUOUS EXERCISE (LIKE A BRISK WALK)?: 7 DAYS

## 2021-09-24 SDOH — HEALTH STABILITY: MENTAL HEALTH
STRESS IS WHEN SOMEONE FEELS TENSE, NERVOUS, ANXIOUS, OR CAN'T SLEEP AT NIGHT BECAUSE THEIR MIND IS TROUBLED. HOW STRESSED ARE YOU?: TO SOME EXTENT

## 2021-09-24 SDOH — ECONOMIC STABILITY: TRANSPORTATION INSECURITY
IN THE PAST 12 MONTHS, HAS LACK OF RELIABLE TRANSPORTATION KEPT YOU FROM MEDICAL APPOINTMENTS, MEETINGS, WORK OR FROM GETTING THINGS NEEDED FOR DAILY LIVING?: NO

## 2021-09-24 SDOH — ECONOMIC STABILITY: INCOME INSECURITY: IN THE LAST 12 MONTHS, WAS THERE A TIME WHEN YOU WERE NOT ABLE TO PAY THE MORTGAGE OR RENT ON TIME?: NO

## 2021-09-24 SDOH — ECONOMIC STABILITY: INCOME INSECURITY: HOW HARD IS IT FOR YOU TO PAY FOR THE VERY BASICS LIKE FOOD, HOUSING, MEDICAL CARE, AND HEATING?: NOT HARD AT ALL

## 2021-09-24 SDOH — ECONOMIC STABILITY: TRANSPORTATION INSECURITY
IN THE PAST 12 MONTHS, HAS LACK OF TRANSPORTATION KEPT YOU FROM MEETINGS, WORK, OR FROM GETTING THINGS NEEDED FOR DAILY LIVING?: NO

## 2021-09-24 SDOH — ECONOMIC STABILITY: FOOD INSECURITY: WITHIN THE PAST 12 MONTHS, YOU WORRIED THAT YOUR FOOD WOULD RUN OUT BEFORE YOU GOT MONEY TO BUY MORE.: NEVER TRUE

## 2021-09-24 ASSESSMENT — SOCIAL DETERMINANTS OF HEALTH (SDOH)
DO YOU BELONG TO ANY CLUBS OR ORGANIZATIONS SUCH AS CHURCH GROUPS UNIONS, FRATERNAL OR ATHLETIC GROUPS, OR SCHOOL GROUPS?: YES
HOW OFTEN DO YOU HAVE SIX OR MORE DRINKS ON ONE OCCASION: NEVER
HOW MANY DRINKS CONTAINING ALCOHOL DO YOU HAVE ON A TYPICAL DAY WHEN YOU ARE DRINKING: PATIENT DECLINED
IN A TYPICAL WEEK, HOW MANY TIMES DO YOU TALK ON THE PHONE WITH FAMILY, FRIENDS, OR NEIGHBORS?: ONCE A WEEK
DO YOU BELONG TO ANY CLUBS OR ORGANIZATIONS SUCH AS CHURCH GROUPS UNIONS, FRATERNAL OR ATHLETIC GROUPS, OR SCHOOL GROUPS?: YES
HOW HARD IS IT FOR YOU TO PAY FOR THE VERY BASICS LIKE FOOD, HOUSING, MEDICAL CARE, AND HEATING?: NOT HARD AT ALL
HOW OFTEN DO YOU ATTEND CHURCH OR RELIGIOUS SERVICES?: NEVER
HOW OFTEN DO YOU ATTEND CHURCH OR RELIGIOUS SERVICES?: NEVER
IN A TYPICAL WEEK, HOW MANY TIMES DO YOU TALK ON THE PHONE WITH FAMILY, FRIENDS, OR NEIGHBORS?: ONCE A WEEK
WITHIN THE PAST 12 MONTHS, YOU WORRIED THAT YOUR FOOD WOULD RUN OUT BEFORE YOU GOT THE MONEY TO BUY MORE: NEVER TRUE
HOW OFTEN DO YOU GET TOGETHER WITH FRIENDS OR RELATIVES?: ONCE A WEEK
HOW OFTEN DO YOU ATTENT MEETINGS OF THE CLUB OR ORGANIZATION YOU BELONG TO?: MORE THAN 4 TIMES PER YEAR
HOW OFTEN DO YOU HAVE A DRINK CONTAINING ALCOHOL: NEVER
HOW OFTEN DO YOU ATTENT MEETINGS OF THE CLUB OR ORGANIZATION YOU BELONG TO?: MORE THAN 4 TIMES PER YEAR
HOW OFTEN DO YOU GET TOGETHER WITH FRIENDS OR RELATIVES?: ONCE A WEEK

## 2021-09-24 ASSESSMENT — LIFESTYLE VARIABLES
HOW MANY STANDARD DRINKS CONTAINING ALCOHOL DO YOU HAVE ON A TYPICAL DAY: PATIENT DECLINED
HOW OFTEN DO YOU HAVE A DRINK CONTAINING ALCOHOL: NEVER
HOW OFTEN DO YOU HAVE SIX OR MORE DRINKS ON ONE OCCASION: NEVER

## 2021-09-27 ENCOUNTER — OFFICE VISIT (OUTPATIENT)
Dept: MEDICAL GROUP | Facility: PHYSICIAN GROUP | Age: 76
End: 2021-09-27
Payer: MEDICARE

## 2021-09-27 VITALS
RESPIRATION RATE: 14 BRPM | BODY MASS INDEX: 23.32 KG/M2 | HEART RATE: 78 BPM | WEIGHT: 140 LBS | DIASTOLIC BLOOD PRESSURE: 78 MMHG | HEIGHT: 65 IN | TEMPERATURE: 97.4 F | SYSTOLIC BLOOD PRESSURE: 132 MMHG | OXYGEN SATURATION: 98 %

## 2021-09-27 DIAGNOSIS — Z87.19 H/O HIATAL HERNIA: ICD-10-CM

## 2021-09-27 DIAGNOSIS — Z78.0 POST-MENOPAUSAL: ICD-10-CM

## 2021-09-27 DIAGNOSIS — R06.02 SOB (SHORTNESS OF BREATH): ICD-10-CM

## 2021-09-27 DIAGNOSIS — E78.2 MIXED HYPERLIPIDEMIA: ICD-10-CM

## 2021-09-27 DIAGNOSIS — R13.19 ESOPHAGEAL DYSPHAGIA: ICD-10-CM

## 2021-09-27 PROCEDURE — 99213 OFFICE O/P EST LOW 20 MIN: CPT | Performed by: STUDENT IN AN ORGANIZED HEALTH CARE EDUCATION/TRAINING PROGRAM

## 2021-09-27 ASSESSMENT — FIBROSIS 4 INDEX: FIB4 SCORE: 2.23

## 2021-09-27 ASSESSMENT — PATIENT HEALTH QUESTIONNAIRE - PHQ9: CLINICAL INTERPRETATION OF PHQ2 SCORE: 0

## 2021-09-27 NOTE — PATIENT INSTRUCTIONS
Please get the labs done in the next few days.    Please get them done while fasting   (no food, coffee, or juices, for 8 hours - but water is ok).     Please return in about 1-2 weeks.  Thank you.

## 2021-09-27 NOTE — ASSESSMENT & PLAN NOTE
Noted after dysphagia and shortness of breath  Repaired 8/11/2021, and slow improvement.   (symptoms continue to improve / resolve).   Followed-up with Dr. Arce / surgeon.   No problems since surgery.  -listed for reference .

## 2021-09-27 NOTE — LETTER
[facility]  Baron Bradford M.D.  [address] [place] [address]  Fax: [phone]   Authorization for Release/Disclosure of   Protected Health Information   Name: ARIELLE STONER : 1945 SSN: [id number]   Address: [facility] [address] [place] [address] Phone:    [phone] (home)    I authorize the entity listed below to release/disclose the PHI below to:   [facility]/Baron Bradford M.D. and Baron Bradford M.D.   Provider or Entity Name:   [facility]   Address   City, State, Zip   Phone:      Fax:     Reason for request: continuity of care   Information to be released:    [X] LAST COLONOSCOPY & EGD,  including any PATH REPORT and follow-up  [  ] LAST FIT/COLOGUARD RESULT [  ] LAST DEXA  [  ] LAST MAMMOGRAM  [  ] LAST PAP  [X] LAST LABS [  ] RETINA EXAM REPORT  [  ] IMMUNIZATION RECORDS  [X] Release LAST 3 NOTES      [  ] Check here and initial the line next to each item to release ALL health information INCLUDING  _____ Care and treatment for drug and / or alcohol abuse  _____ HIV testing, infection status, or AIDS  _____ Genetic Testing    DATES OF SERVICE OR TIME PERIOD TO BE DISCLOSED: _____________  I understand and acknowledge that:  * This Authorization may be revoked at any time by you in writing, except if your health information has already been used or disclosed.  * Your health information that will be used or disclosed as a result of you signing this authorization could be re-disclosed by the recipient. If this occurs, your re-disclosed health information may no longer be protected by State or Federal laws.  * You may refuse to sign this Authorization. Your refusal will not affect your ability to obtain treatment.  * This Authorization becomes effective upon signing and will  on (date) __________.      If no date is indicated, this Authorization will  one (1) year from the signature date.    Name: Arielle Stoner    Signature:   Date:     2021        PLEASE FAX REQUESTED RECORDS BACK TO: [phone]

## 2021-09-27 NOTE — ASSESSMENT & PLAN NOTE
Chronic and ongoing.   Had been on meds about 15 yrs ago,      but had high glucose so stopped it.   No recent medications.   Denies:  angina, palpitations, or dyspnea   - get new labs, and re-evaluate.

## 2021-09-27 NOTE — ASSESSMENT & PLAN NOTE
Prior issue.  Had been in the setting of a hiatal hernia.   Since the surgery, has been continuing to get better.  Denies current orthopnea, dyspnea, or fatigue.   - resolved.  (was secondary to hiatal hernia).   - listed for reference.

## 2021-09-27 NOTE — ASSESSMENT & PLAN NOTE
Notes this was with the hx of hiatal hernia.   She states surgery has since fixed this.   No further trouble swallowing.   - get outside records.   - listed for reference.

## 2021-09-27 NOTE — LETTER
[facility]  Baron Bardford M.D.  [address] [place] [address]  Fax: [phone]   Authorization for Release/Disclosure of   Protected Health Information   Name: ARIELLE STONER : 1945 SSN: [id number]   Address: [facility] [address] [place] [address] Phone:    [phone] (home)    I authorize the entity listed below to release/disclose the PHI below to:   [facility]/Baron Bradford M.D. and Baron Bradford M.D.   Provider or Entity Name:   [facility] (outpatient)  (Dr. Jayjay Blandon).     Address   City, State, [facility]   Phone:      Fax:     Reason for request: continuity of care   Information to be released:    [X] LAST COLONOSCOPY, & EGD including any PATH REPORT and follow-up  [  ] LAST FIT/COLOGUARD RESULT [  ] LAST DEXA  [X] LAST MAMMOGRAM  [X] LAST PAP  [X] LAST LABS [  ] RETINA EXAM REPORT  [X] IMMUNIZATION RECORDS  [X] Release LAST 4 NOTES      [  ] Check here and initial the line next to each item to release ALL health information INCLUDING  _____ Care and treatment for drug and / or alcohol abuse  _____ HIV testing, infection status, or AIDS  _____ Genetic Testing    DATES OF SERVICE OR TIME PERIOD TO BE DISCLOSED: _____________  I understand and acknowledge that:  * This Authorization may be revoked at any time by you in writing, except if your health information has already been used or disclosed.  * Your health information that will be used or disclosed as a result of you signing this authorization could be re-disclosed by the recipient. If this occurs, your re-disclosed health information may no longer be protected by State or Federal laws.  * You may refuse to sign this Authorization. Your refusal will not affect your ability to obtain treatment.  * This Authorization becomes effective upon signing and will  on (date) __________.      If no date is indicated, this Authorization will  one (1) year from the signature date.    Name: Arielle Stoner    Signature:   Date:      9/27/2021       PLEASE FAX REQUESTED RECORDS BACK TO: [phone]

## 2021-09-27 NOTE — PROGRESS NOTES
New to Provider  (and [facility])      Shelby Mireles is a 76 y.o. female.    Reason for visit: New patient to establish        - Saw prior PCP a few times (Dr. Jayjay Blandon, at [facility])      But wants to be in the [facility] system...   Chief Complaint   Patient presents with   • Establish Care     new patient             Problems discussed this visit:    This note uses problem-based documentation.  Subjective HPI is included in Assessment & Plan, at bottom of note.   Problem   Post-Menopausal   SOB (shortness of breath)  (Resolved).    H/O Hiatal Hernia   Esophageal dysphagia  (Resolved)   Mixed Hyperlipidemia                            Past Medical History:   Diagnosis Date   • Arthritis     Hips, feet   • Breath shortness     when had hiatal hernia   • Cataract     Surgery bilat   • Heart burn    • High cholesterol    • Indigestion     GERD       Past Surgical History:   Procedure Laterality Date   • PB LAP,ESOPHAGOGAST FUNDOPLASTY N/A 8/11/2021    Procedure: FUNDOPLICATION, PARTIAL, LAPAROSCOPIC - PARAESOPHAGEAL WITH MESH.;  Surgeon: Noble Arce M.D.;  Location: SURGERY [facility];  Service: General   • OTHER  1978    Breast implants   • GYN SURGERY  1976    Tubal ligation   • TONSILLECTOMY  1947       Family History   Problem Relation Age of Onset   • No Known Problems Mother    • Heart Disease Father         began in his 40's   • Parkinson's Disease Father    • Cancer Maternal Aunt         pancreatic cancer   • Cancer Paternal Uncle         bladder   • Cancer Maternal Grandmother         breast cancer   • Heart Disease Paternal Grandfather         CHF   • No Known Problems Daughter    • No Known Problems Daughter        Social History     Tobacco Use   • Smoking status: Never Smoker   • Smokeless tobacco: Never Used   Substance Use Topics   • Alcohol use: Not Currently       Current medications:  (including new changes):  Current Outpatient Medications   Medication Sig Dispense Refill   • olopatadine  "(PATADAY) 0.1 % ophthalmic solution Administer 1 Drop into both eyes 1 time a day as needed for Allergies. Through Opt... for optical rosacea.     • Glycerin-Polysorbate 80 (REFRESH DRY EYE THERAPY OP) Administer 1 Drop into both eyes 1 time a day as needed.     • acetaminophen (TYLENOL) 500 MG Tab Take 250 mg by mouth 1 time a day as needed. Couple times per week... OTC  Indications: Pain       No current facility-administered medications for this visit.       Allergies as of 09/27/2021 - Reviewed 09/27/2021   Allergen Reaction Noted   • Tetracycline  07/04/2020                    Review of Symptoms  (as noted elsewhere, and .....)    GEN/CONST:   Denies fever, chills, fatigue,    CARDIO:   Denies chest pain, palpitations, or edema.    RESP:   Denies shortness of breath, wheezing, or coughing.  GI:    Denies current nausea, vomiting, diarrhea,        + prior issues, before surgery..  See HPI...   :   Denies dysuria, hematuria,         PSYCH:  Denies anxiety, depression,        Physical Exam  /78 (BP Location: Left arm, Patient Position: Sitting, BP Cuff Size: Adult)   Pulse 78   Temp 36.3 °C (97.4 °F) (Temporal)   Resp 14   Ht 1.638 m (5' 4.5\")   Wt 63.5 kg (140 lb)   SpO2 98%   BMI 23.66 kg/m²   General:  Alert and oriented, No apparent distress.  Neck: Supple. No lymphadenopathy noted. Thyroid not enlarged.   Lungs: Clear to auscultation bilaterally.  No wheezes or rales.     Cardiovascular: Regular rate and rhythm.  No murmurs, or gallops.  Abdomen:  Soft.  Non-distended.  Non-tender.     Extremities: No leg edema.  Good general motion of extremities.    Psychological: Appears to have normal mood and affect.        Labs:  Recent / Prior labs reviewed.     CBC, BMP and CMP                             Assessment & Plan  (with subjective history and orders):  Of note, the list below is not in a specific nor sortable order.      Problem List Items Addressed This Visit     Esophageal dysphagia  " (Resolved)     Notes this was with the hx of hiatal hernia.   She states surgery has since fixed this.   No further trouble swallowing.   - get outside records.   - listed for reference.          H/O hiatal hernia     Noted after dysphagia and shortness of breath  Repaired 8/11/2021, and slow improvement.   (symptoms continue to improve / resolve).   Followed-up with Dr. Arce / surgeon.   No problems since surgery.  -listed for reference .         Mixed hyperlipidemia     Chronic and ongoing.   Had been on meds about 15 yrs ago,      but had high glucose so stopped it.   No recent medications.   Denies:  angina, palpitations, or dyspnea   - get new labs, and re-evaluate.          Relevant Orders    Lipid Profile    TSH WITH REFLEX TO FT4    Post-menopausal    Relevant Orders    VITAMIN D,25 HYDROXY    SOB (shortness of breath)  (Resolved).      Prior issue.  Had been in the setting of a hiatal hernia.   Since the surgery, has been continuing to get better.  Denies current orthopnea, dyspnea, or fatigue.   - resolved.  (was secondary to hiatal hernia).   - listed for reference.              Of note, the above list is not in a specific nor sortable order.                  Diagnosis Table, with orders:  1. Mixed hyperlipidemia  Lipid Profile    TSH WITH REFLEX TO FT4   2. H/O hiatal hernia     3. SOB (shortness of breath)     4. Esophageal dysphagia     5. Post-menopausal  VITAMIN D,25 HYDROXY                 Follow-up:  2 weeks  (HLD and labs, and screenings).                 A computerized dictation system may have been used in this note.    Despite review, there may be some errors in spelling or grammar.    Baron Bradford M.D.  9/27/2021

## 2021-09-29 ENCOUNTER — HOSPITAL ENCOUNTER (OUTPATIENT)
Dept: LAB | Facility: MEDICAL CENTER | Age: 76
End: 2021-09-29
Attending: STUDENT IN AN ORGANIZED HEALTH CARE EDUCATION/TRAINING PROGRAM
Payer: MEDICARE

## 2021-09-29 DIAGNOSIS — E78.2 MIXED HYPERLIPIDEMIA: ICD-10-CM

## 2021-09-29 DIAGNOSIS — Z78.0 POST-MENOPAUSAL: ICD-10-CM

## 2021-09-29 LAB
CHOLEST SERPL-MCNC: 306 MG/DL (ref 100–199)
FASTING STATUS PATIENT QL REPORTED: NORMAL
HDLC SERPL-MCNC: 48 MG/DL
LDLC SERPL CALC-MCNC: 220 MG/DL
TRIGL SERPL-MCNC: 188 MG/DL (ref 0–149)
TSH SERPL DL<=0.005 MIU/L-ACNC: 1.22 UIU/ML (ref 0.38–5.33)

## 2021-09-29 PROCEDURE — 36415 COLL VENOUS BLD VENIPUNCTURE: CPT

## 2021-09-29 PROCEDURE — 84443 ASSAY THYROID STIM HORMONE: CPT

## 2021-09-29 PROCEDURE — 80061 LIPID PANEL: CPT

## 2021-10-13 ENCOUNTER — OFFICE VISIT (OUTPATIENT)
Dept: MEDICAL GROUP | Facility: PHYSICIAN GROUP | Age: 76
End: 2021-10-13
Payer: MEDICARE

## 2021-10-13 VITALS
WEIGHT: 141 LBS | BODY MASS INDEX: 24.07 KG/M2 | HEART RATE: 74 BPM | SYSTOLIC BLOOD PRESSURE: 126 MMHG | TEMPERATURE: 96.9 F | HEIGHT: 64 IN | OXYGEN SATURATION: 99 % | DIASTOLIC BLOOD PRESSURE: 78 MMHG | RESPIRATION RATE: 16 BRPM

## 2021-10-13 DIAGNOSIS — Z79.899 MEDICATION MANAGEMENT: ICD-10-CM

## 2021-10-13 DIAGNOSIS — E78.2 MIXED HYPERLIPIDEMIA: ICD-10-CM

## 2021-10-13 PROCEDURE — 99214 OFFICE O/P EST MOD 30 MIN: CPT | Performed by: STUDENT IN AN ORGANIZED HEALTH CARE EDUCATION/TRAINING PROGRAM

## 2021-10-13 RX ORDER — ATORVASTATIN CALCIUM 40 MG/1
40 TABLET, FILM COATED ORAL
Qty: 90 TABLET | Refills: 1 | Status: SHIPPED
Start: 2021-10-13 | End: 2021-12-13

## 2021-10-13 ASSESSMENT — FIBROSIS 4 INDEX: FIB4 SCORE: 2.23

## 2021-10-13 NOTE — PROGRESS NOTES
Established Patient     Shelby Mireles is a 76 y.o. female.    Chief Complaint   Patient presents with   • Lab Results     lab review              Problems addressed this visit:     This note uses problem-based documentation.  Subjective HPI is included in Assessment & Plan, at bottom of note.   Problem   Mixed Hyperlipidemia                           Past Medical History:   Diagnosis Date   • Arthritis     Hips, feet   • Breath shortness     when had hiatal hernia   • Cataract     Surgery bilat   • Heart burn    • High cholesterol    • Indigestion     GERD       Patient Active Problem List   Diagnosis   • SOB (shortness of breath)  (Resolved).    • H/O hiatal hernia   • Esophageal dysphagia  (Resolved)   • Mixed hyperlipidemia   • Post-menopausal       Past Surgical History:   Procedure Laterality Date   • PB LAP,ESOPHAGOGAST FUNDOPLASTY N/A 8/11/2021    Procedure: FUNDOPLICATION, PARTIAL, LAPAROSCOPIC - PARAESOPHAGEAL WITH MESH.;  Surgeon: Noble Arce M.D.;  Location: SURGERY [facility];  Service: General   • OTHER  1978    Breast implants   • GYN SURGERY  1976    Tubal ligation   • TONSILLECTOMY  1947       Family History   Problem Relation Age of Onset   • No Known Problems Mother    • Heart Disease Father         began in his 40's   • Parkinson's Disease Father    • Cancer Maternal Aunt         pancreatic cancer   • Cancer Paternal Uncle         bladder   • Cancer Maternal Grandmother         breast cancer   • Heart Disease Paternal Grandfather         CHF   • No Known Problems Daughter    • No Known Problems Daughter        Social History     Tobacco Use   • Smoking status: Never Smoker   • Smokeless tobacco: Never Used   Substance Use Topics   • Alcohol use: Not Currently       Current medications:  (including new changes):  Current Outpatient Medications   Medication Sig Dispense Refill   • atorvastatin (LIPITOR) 40 MG Tab Take 1 Tablet by mouth at bedtime. 90 Tablet 1   • acetaminophen (TYLENOL) 500  "MG Tab Take 250 mg by mouth 1 time a day as needed. Couple times per week... OTC  Indications: Pain     • olopatadine (PATADAY) 0.1 % ophthalmic solution Administer 1 Drop into both eyes 1 time a day as needed for Allergies. Through Opt... for optical rosacea.     • Glycerin-Polysorbate 80 (REFRESH DRY EYE THERAPY OP) Administer 1 Drop into both eyes 1 time a day as needed.       No current facility-administered medications for this visit.       Allergies as of 10/13/2021 - Reviewed 10/13/2021   Allergen Reaction Noted   • Tetracycline  07/04/2020                   Review of Symptoms   (as noted elsewhere, and .....)   GEN/CONST:   Denies fever, chills,    CARDIO:   Denies chest pain, or edema.    RESP:   Denies shortness of breath, or coughing.  GI:    Denies nausea, vomiting, diarrhea,      PSYCH:  Denies anxiety, depression,           Physical Exam  /78 (BP Location: Right arm, Patient Position: Sitting, BP Cuff Size: Adult)   Pulse 74   Temp 36.1 °C (96.9 °F) (Temporal)   Resp 16   Ht 1.626 m (5' 4\")   Wt 64 kg (141 lb)   SpO2 99%   BMI 24.20 kg/m²   General:  Alert and oriented, No apparent distress.  Neck: Trachea midline, no masses, no obvious thyromegaly.  Lungs: Easy / unlabored breathing, without difficulty.  Good oxygenation.   MSK:  Good general motion of extremities. Normal ambulation.    Psych: Appears to have normal mood and affect.        Labs:  Recent / Prior labs reviewed.    Lipids and TSH                             Assessment & Plan  (with subjective history and orders):  Of note, the list below is not in a specific nor sortable order.      Problem List Items Addressed This Visit     Mixed hyperlipidemia     Chronic and ongoing.   Had been on meds about 15 yrs ago,      but had high glucose so stopped it.   No recent medications.   Recent labs with LDL - 220 (and TG-188).      (ascvd ~20.7%)   Denies:  angina, palpitations, or dyspnea   - now willing to restart med.   - start " Lipitor 40     - get new labs, and re-evaluate in 2 months.          Relevant Medications    atorvastatin (LIPITOR) 40 MG Tab    Other Relevant Orders    Comp Metabolic Panel    Lipid Profile      Other Visit Diagnoses     Medication management        Relevant Orders    Comp Metabolic Panel    Lipid Profile                    Diagnosis Table, with orders:  1. Mixed hyperlipidemia  atorvastatin (LIPITOR) 40 MG Tab    Comp Metabolic Panel    Lipid Profile   2. Medication management  Comp Metabolic Panel    Lipid Profile                 Follow-up:  2 months  (HLD on statin....)              A computerized dictation system may have been used in this note.    Despite review, there may be some errors in spelling or grammar.    Baron Bradford M.D.  10/13/2021

## 2021-10-13 NOTE — PATIENT INSTRUCTIONS
Please start the atorvastatin (Lipitor) 40 mg, nightly.   ...  Please take HALF a tab, nightly for first 4-6 days...  ... Then increase to full tab, at night.     Please get the labs in about 7-8 weeks.  Please get the labs done at least a week prior to your next visit.    Please get them done while fasting   (no food, coffee, or juices, for 8 hours - but water is ok).     Thank you.

## 2021-10-13 NOTE — ASSESSMENT & PLAN NOTE
Chronic and ongoing.   Had been on meds about 15 yrs ago,      but had high glucose so stopped it.   No recent medications.   Recent labs with LDL - 220 (and TG-188).      (ascvd ~20.7%)   Denies:  angina, palpitations, or dyspnea   - now willing to restart med.   - start Lipitor 40     - get new labs, and re-evaluate in 2 months.

## 2021-12-08 ENCOUNTER — HOSPITAL ENCOUNTER (OUTPATIENT)
Dept: LAB | Facility: MEDICAL CENTER | Age: 76
End: 2021-12-08
Attending: STUDENT IN AN ORGANIZED HEALTH CARE EDUCATION/TRAINING PROGRAM
Payer: MEDICARE

## 2021-12-08 DIAGNOSIS — E78.2 MIXED HYPERLIPIDEMIA: ICD-10-CM

## 2021-12-08 DIAGNOSIS — Z79.899 MEDICATION MANAGEMENT: ICD-10-CM

## 2021-12-08 LAB
ALBUMIN SERPL BCP-MCNC: 4.8 G/DL (ref 3.2–4.9)
ALBUMIN/GLOB SERPL: 2.5 G/DL
ALP SERPL-CCNC: 81 U/L (ref 30–99)
ALT SERPL-CCNC: 30 U/L (ref 2–50)
ANION GAP SERPL CALC-SCNC: 11 MMOL/L (ref 7–16)
AST SERPL-CCNC: 31 U/L (ref 12–45)
BILIRUB SERPL-MCNC: 0.7 MG/DL (ref 0.1–1.5)
BUN SERPL-MCNC: 16 MG/DL (ref 8–22)
CALCIUM SERPL-MCNC: 9.3 MG/DL (ref 8.5–10.5)
CHLORIDE SERPL-SCNC: 105 MMOL/L (ref 96–112)
CHOLEST SERPL-MCNC: 163 MG/DL (ref 100–199)
CO2 SERPL-SCNC: 26 MMOL/L (ref 20–33)
CREAT SERPL-MCNC: 0.51 MG/DL (ref 0.5–1.4)
FASTING STATUS PATIENT QL REPORTED: NORMAL
GLOBULIN SER CALC-MCNC: 1.9 G/DL (ref 1.9–3.5)
GLUCOSE SERPL-MCNC: 127 MG/DL (ref 65–99)
HDLC SERPL-MCNC: 61 MG/DL
LDLC SERPL CALC-MCNC: 76 MG/DL
POTASSIUM SERPL-SCNC: 4.4 MMOL/L (ref 3.6–5.5)
PROT SERPL-MCNC: 6.7 G/DL (ref 6–8.2)
SODIUM SERPL-SCNC: 142 MMOL/L (ref 135–145)
TRIGL SERPL-MCNC: 130 MG/DL (ref 0–149)

## 2021-12-08 PROCEDURE — 80061 LIPID PANEL: CPT

## 2021-12-08 PROCEDURE — 36415 COLL VENOUS BLD VENIPUNCTURE: CPT

## 2021-12-08 PROCEDURE — 80053 COMPREHEN METABOLIC PANEL: CPT

## 2021-12-13 ENCOUNTER — OFFICE VISIT (OUTPATIENT)
Dept: MEDICAL GROUP | Facility: PHYSICIAN GROUP | Age: 76
End: 2021-12-13
Payer: MEDICARE

## 2021-12-13 VITALS
TEMPERATURE: 98.9 F | RESPIRATION RATE: 16 BRPM | BODY MASS INDEX: 24.41 KG/M2 | HEART RATE: 79 BPM | SYSTOLIC BLOOD PRESSURE: 114 MMHG | OXYGEN SATURATION: 97 % | WEIGHT: 143 LBS | HEIGHT: 64 IN | DIASTOLIC BLOOD PRESSURE: 72 MMHG

## 2021-12-13 DIAGNOSIS — M85.80 OSTEOPENIA, UNSPECIFIED LOCATION: ICD-10-CM

## 2021-12-13 DIAGNOSIS — Z23 NEED FOR VACCINATION: ICD-10-CM

## 2021-12-13 DIAGNOSIS — E78.2 MIXED HYPERLIPIDEMIA: ICD-10-CM

## 2021-12-13 DIAGNOSIS — Z78.0 POST-MENOPAUSAL: ICD-10-CM

## 2021-12-13 PROBLEM — R06.02 SOB (SHORTNESS OF BREATH): Status: RESOLVED | Noted: 2021-05-21 | Resolved: 2021-12-13

## 2021-12-13 PROBLEM — R13.19 ESOPHAGEAL DYSPHAGIA: Status: RESOLVED | Noted: 2021-05-21 | Resolved: 2021-12-13

## 2021-12-13 PROCEDURE — 90732 PPSV23 VACC 2 YRS+ SUBQ/IM: CPT | Performed by: STUDENT IN AN ORGANIZED HEALTH CARE EDUCATION/TRAINING PROGRAM

## 2021-12-13 PROCEDURE — 99214 OFFICE O/P EST MOD 30 MIN: CPT | Mod: 25 | Performed by: STUDENT IN AN ORGANIZED HEALTH CARE EDUCATION/TRAINING PROGRAM

## 2021-12-13 PROCEDURE — G0009 ADMIN PNEUMOCOCCAL VACCINE: HCPCS | Performed by: STUDENT IN AN ORGANIZED HEALTH CARE EDUCATION/TRAINING PROGRAM

## 2021-12-13 ASSESSMENT — FIBROSIS 4 INDEX: FIB4 SCORE: 2.43

## 2021-12-13 NOTE — PROGRESS NOTES
Established Patient     Shelby Mireles is a 76 y.o. female.    Chief Complaint   Patient presents with   • Lab Results     FV on lab results             Problems addressed this visit:     This note uses problem-based documentation.  Subjective HPI is included in Assessment & Plan, at bottom of note.   Problem   Osteopenia   Mixed Hyperlipidemia   SOB (shortness of breath)  (Resolved).  (Resolved)   Esophageal dysphagia  (Resolved) (Resolved)                           Past Medical History:   Diagnosis Date   • Arthritis     Hips, feet   • Breath shortness     when had hiatal hernia   • Cataract     Surgery bilat   • Heart burn    • High cholesterol    • Indigestion     GERD       Patient Active Problem List   Diagnosis   • H/O hiatal hernia   • Mixed hyperlipidemia   • Post-menopausal   • Osteopenia       Past Surgical History:   Procedure Laterality Date   • PB LAP,ESOPHAGOGAST FUNDOPLASTY N/A 8/11/2021    Procedure: FUNDOPLICATION, PARTIAL, LAPAROSCOPIC - PARAESOPHAGEAL WITH MESH.;  Surgeon: Noble Arce M.D.;  Location: SURGERY [facility];  Service: General   • OTHER  1978    Breast implants   • GYN SURGERY  1976    Tubal ligation   • TONSILLECTOMY  1947       Family History   Problem Relation Age of Onset   • No Known Problems Mother    • Heart Disease Father         began in his 40's   • Parkinson's Disease Father    • Cancer Maternal Aunt         pancreatic cancer   • Cancer Paternal Uncle         bladder   • Cancer Maternal Grandmother         breast cancer   • Heart Disease Paternal Grandfather         CHF   • No Known Problems Daughter    • No Known Problems Daughter        Social History     Tobacco Use   • Smoking status: Never Smoker   • Smokeless tobacco: Never Used   Substance Use Topics   • Alcohol use: Not Currently       Current medications:  (including new changes):  Current Outpatient Medications   Medication Sig Dispense Refill   • acetaminophen (TYLENOL) 500 MG Tab Take 250 mg by mouth 1  "time a day as needed. Couple times per week... OTC  Indications: Pain     • olopatadine (PATADAY) 0.1 % ophthalmic solution Administer 1 Drop into both eyes 1 time a day as needed for Allergies. Through Opt... for optical rosacea.     • Glycerin-Polysorbate 80 (REFRESH DRY EYE THERAPY OP) Administer 1 Drop into both eyes 1 time a day as needed.       No current facility-administered medications for this visit.       Allergies as of 12/13/2021 - Reviewed 12/13/2021   Allergen Reaction Noted   • Lipitor [atorvastatin]  12/13/2021   • Tetracycline  07/04/2020                   Review of Symptoms   (as noted elsewhere, and .....)   GEN/CONST:   Denies fever, chills,    CARDIO:   Denies chest pain, or edema.    RESP:   Denies shortness of breath, or coughing.  GI:    Denies nausea, vomiting, diarrhea,      PSYCH:  Denies anxiety, depression,           Physical Exam  /72 (BP Location: Left arm, Patient Position: Sitting, BP Cuff Size: Adult)   Pulse 79   Temp 37.2 °C (98.9 °F) (Temporal)   Resp 16   Ht 1.626 m (5' 4\")   Wt 64.9 kg (143 lb)   SpO2 97%   BMI 24.55 kg/m²   General:  Alert and oriented, No apparent distress.  Neck: Trachea midline, no masses, no obvious thyromegaly.  Lungs: Easy / unlabored breathing, without difficulty.  Good oxygenation.   MSK:  Good general motion of extremities. Normal ambulation.    Psych:  Appears to have normal mood and affect.        Labs:  Recent / Last-Prior labs reviewed.    CMP and Lipids                             Assessment & Plan  (with subjective history and orders):  Of note, the list below is not in a specific nor sortable order.      Problem List Items Addressed This Visit     Mixed hyperlipidemia     Chronic and ongoing.   Had been on meds about 15 yrs ago,      but had high glucose so stopped it.   Retried Lipitor, but again with elevated glucose,     And also with muscle aches, and joint pains...     ... so, she stopped on her own.   Denies:  angina, " palpitations, or dyspnea   - patient declines further cholesterol management.   - given side-effects, and patient age,     no medications for now.          Osteopenia     Patient now notes prior hx of osteopenia.   Prior falls with ankle and clavicle fracture  (but also in setting of falls / cause).   Due for Dexa.  - can get dexa.          Relevant Orders    DS-BONE DENSITY STUDY (DEXA)    Post-menopausal    Relevant Orders    DS-BONE DENSITY STUDY (DEXA)      Other Visit Diagnoses     Need for vaccination        Relevant Orders    Pneumococal Polysaccharide Vaccine 23-Valent =>3YO SQ/IM        Of note, the above list is not in a specific nor sortable order.                  Diagnosis Table, with orders:  1. Mixed hyperlipidemia     2. Osteopenia, unspecified location  DS-BONE DENSITY STUDY (DEXA)   3. Post-menopausal  DS-BONE DENSITY STUDY (DEXA)   4. Need for vaccination  Pneumococal Polysaccharide Vaccine 23-Valent =>3YO SQ/IM                 Follow-up:  July - annual visit (and order future labs).               A computerized dictation system may have been used in this note.    Despite review, there may be some errors in spelling or grammar.    Baron Bradford M.D.  12/13/2021

## 2021-12-13 NOTE — ASSESSMENT & PLAN NOTE
Patient now notes prior hx of osteopenia.   Prior falls with ankle and clavicle fracture  (but also in setting of falls / cause).   Due for Dexa.  - can get dexa.

## 2021-12-13 NOTE — ASSESSMENT & PLAN NOTE
Chronic and ongoing.   Had been on meds about 15 yrs ago,      but had high glucose so stopped it.   Retried Lipitor, but again with elevated glucose,     And also with muscle aches, and joint pains...     ... so, she stopped on her own.   Denies:  angina, palpitations, or dyspnea   - patient declines further cholesterol management.   - given side-effects, and patient age,     no medications for now.

## 2021-12-13 NOTE — PATIENT INSTRUCTIONS
Please get the bone density (dexa) testing done, and return for a general visit in July.  Thank you.

## 2022-01-03 ENCOUNTER — HOSPITAL ENCOUNTER (OUTPATIENT)
Dept: RADIOLOGY | Facility: MEDICAL CENTER | Age: 77
End: 2022-01-03
Attending: STUDENT IN AN ORGANIZED HEALTH CARE EDUCATION/TRAINING PROGRAM
Payer: MEDICARE

## 2022-01-03 DIAGNOSIS — Z78.0 POST-MENOPAUSAL: ICD-10-CM

## 2022-01-03 DIAGNOSIS — M85.80 OSTEOPENIA, UNSPECIFIED LOCATION: ICD-10-CM

## 2022-01-03 PROCEDURE — 77080 DXA BONE DENSITY AXIAL: CPT

## 2022-04-20 ENCOUNTER — OFFICE VISIT (OUTPATIENT)
Dept: MEDICAL GROUP | Facility: PHYSICIAN GROUP | Age: 77
End: 2022-04-20
Payer: MEDICARE

## 2022-04-20 VITALS
WEIGHT: 149 LBS | HEART RATE: 86 BPM | TEMPERATURE: 97.6 F | DIASTOLIC BLOOD PRESSURE: 72 MMHG | SYSTOLIC BLOOD PRESSURE: 122 MMHG | HEIGHT: 64 IN | OXYGEN SATURATION: 100 % | BODY MASS INDEX: 25.44 KG/M2

## 2022-04-20 DIAGNOSIS — E78.2 MIXED HYPERLIPIDEMIA: ICD-10-CM

## 2022-04-20 DIAGNOSIS — R73.01 ELEVATED FASTING BLOOD SUGAR: ICD-10-CM

## 2022-04-20 PROCEDURE — 99213 OFFICE O/P EST LOW 20 MIN: CPT | Performed by: NURSE PRACTITIONER

## 2022-04-20 ASSESSMENT — PATIENT HEALTH QUESTIONNAIRE - PHQ9: CLINICAL INTERPRETATION OF PHQ2 SCORE: 0

## 2022-04-20 ASSESSMENT — FIBROSIS 4 INDEX: FIB4 SCORE: 2.46

## 2022-04-20 NOTE — PROGRESS NOTES
Subjective:     CC:    Chief Complaint   Patient presents with   • Establish Care        HISTORY OF THE PRESENT ILLNESS: Patient is a pleasant 77 y.o. female, here today to establish care. Prior PCP was Dr Thomas. The below problems were discussed/reviewed at this visit:    Problem   Mixed Hyperlipidemia    Not tolerating statin     Ref. Range 12/8/2021 07:44   Cholesterol,Tot Latest Ref Range: 100 - 199 mg/dL 163   Triglycerides Latest Ref Range: 0 - 149 mg/dL 130   HDL Latest Ref Range: >=40 mg/dL 61   LDL Latest Ref Range: <100 mg/dL 76            Current Outpatient Medications Ordered in Epic   Medication Sig Dispense Refill   • acetaminophen (TYLENOL) 500 MG Tab Take 250 mg by mouth 1 time a day as needed. Couple times per week... OTC  Indications: Pain     • olopatadine (PATANOL) 0.1 % ophthalmic solution Administer 1 Drop into both eyes 1 time a day as needed for Allergies. Through Opt... for optical rosacea.     • Glycerin-Polysorbate 80 (REFRESH DRY EYE THERAPY OP) Administer 1 Drop into both eyes 1 time a day as needed.       No current Epic-ordered facility-administered medications on file.        Past Surgical History:   Procedure Laterality Date   • PA LAP,ESOPHAGOGAST FUNDOPLASTY N/A 8/11/2021    Procedure: FUNDOPLICATION, PARTIAL, LAPAROSCOPIC - PARAESOPHAGEAL WITH MESH.;  Surgeon: Noble Arce M.D.;  Location: SURGERY [facility];  Service: General   • OTHER  1978    Breast implants   • GYN SURGERY  1976    Tubal ligation   • TONSILLECTOMY  1947        Allergies:  Lipitor [atorvastatin] and Tetracycline    Health Maintenance: Completed    ROS:   Review of Systems   Constitutional: Negative.  Negative for fever and malaise/fatigue.   HENT: Negative.    Eyes: Negative.    Respiratory: Negative for cough, sputum production and shortness of breath.    Cardiovascular: Negative for chest pain, palpitations and leg swelling.   Gastrointestinal: Negative.    Genitourinary: Negative.    Musculoskeletal:  "Negative.    Neurological: Negative.    Endo/Heme/Allergies: Negative.    Psychiatric/Behavioral: Negative.        Objective:     Exam: /72 (BP Location: Left arm, Patient Position: Sitting, BP Cuff Size: Adult)   Pulse 86   Temp 36.4 °C (97.6 °F) (Temporal)   Ht 1.626 m (5' 4\")   Wt 67.6 kg (149 lb)   SpO2 100%  Body mass index is 25.58 kg/m².    Physical Exam  Constitutional:       Appearance: Normal appearance.   Cardiovascular:      Rate and Rhythm: Normal rate and regular rhythm.      Pulses: Normal pulses.      Heart sounds: Normal heart sounds.   Pulmonary:      Effort: Pulmonary effort is normal.      Breath sounds: Normal breath sounds.   Musculoskeletal:         General: Normal range of motion.      Cervical back: Normal range of motion and neck supple.   Skin:     General: Skin is warm and dry.   Neurological:      General: No focal deficit present.      Mental Status: She is alert and oriented to person, place, and time.   Psychiatric:         Mood and Affect: Mood normal.         Behavior: Behavior normal.         Thought Content: Thought content normal.         Judgment: Judgment normal.       Labs: reviewed with patient    Assessment & Plan:   77 y.o. female with the following -    Problem List Items Addressed This Visit     Mixed hyperlipidemia     States she recently tried Lipitor again when her cholesterol panel elevated; it brought panel down but at the same time  raised her glucose level so she does not wish to go on any statin.  Her ascvd risk remains elevated and she understands cardiovascular risks involved. The 10-year ASCVD risk score (Emre DAVENPORT Jr., et al., 2013) is: 22.8%   We discussed other supplements like COQ10, christel 3 fish oil. She will try one of these. She continues with lifestyle management as well  - recheck fasting lipid in 6 months         Relevant Orders    Lipid Profile      Other Visit Diagnoses     Elevated fasting blood sugar        Relevant Orders    Blood Glucose "        Educated in proper administration of medication(s) ordered today including safety, possible SE, risks, benefits, rationale and alternatives to therapy.     Return in about 6 months (around 10/20/2022) for lipid lab review.    Please note that this dictation was created using voice recognition software. I have made every reasonable attempt to correct obvious errors, but I expect that there are errors of grammar and possibly content that I did not discover before finalizing the note.

## 2022-04-21 ASSESSMENT — ENCOUNTER SYMPTOMS
SPUTUM PRODUCTION: 0
FEVER: 0
CONSTITUTIONAL NEGATIVE: 1
PALPITATIONS: 0
SHORTNESS OF BREATH: 0
COUGH: 0
EYES NEGATIVE: 1
MUSCULOSKELETAL NEGATIVE: 1
PSYCHIATRIC NEGATIVE: 1
GASTROINTESTINAL NEGATIVE: 1
NEUROLOGICAL NEGATIVE: 1

## 2022-04-22 NOTE — ASSESSMENT & PLAN NOTE
States she recently tried Lipitor again when her cholesterol panel elevated; it brought panel down but at the same time  raised her glucose level so she does not wish to go on any statin.  Her ascvd risk remains elevated and she understands cardiovascular risks involved. The 10-year ASCVD risk score (Emre DAVENPORT Jr., et al., 2013) is: 22.8%   We discussed other supplements like COQ10, christel 3 fish oil. She will try one of these. She continues with lifestyle management as well  - recheck fasting lipid in 6 months

## 2022-10-11 ENCOUNTER — HOSPITAL ENCOUNTER (OUTPATIENT)
Dept: LAB | Facility: MEDICAL CENTER | Age: 77
End: 2022-10-11
Attending: NURSE PRACTITIONER
Payer: MEDICARE

## 2022-10-11 DIAGNOSIS — R73.01 ELEVATED FASTING BLOOD SUGAR: ICD-10-CM

## 2022-10-11 DIAGNOSIS — E78.2 MIXED HYPERLIPIDEMIA: ICD-10-CM

## 2022-10-11 LAB
CHOLEST SERPL-MCNC: 317 MG/DL (ref 100–199)
FASTING STATUS PATIENT QL REPORTED: NORMAL
GLUCOSE SERPL-MCNC: 116 MG/DL (ref 65–99)
HDLC SERPL-MCNC: 48 MG/DL
LDLC SERPL CALC-MCNC: 233 MG/DL
TRIGL SERPL-MCNC: 182 MG/DL (ref 0–149)

## 2022-10-11 PROCEDURE — 80061 LIPID PANEL: CPT

## 2022-10-11 PROCEDURE — 82947 ASSAY GLUCOSE BLOOD QUANT: CPT

## 2022-10-11 PROCEDURE — 36415 COLL VENOUS BLD VENIPUNCTURE: CPT

## 2022-10-21 ENCOUNTER — OFFICE VISIT (OUTPATIENT)
Dept: MEDICAL GROUP | Facility: PHYSICIAN GROUP | Age: 77
End: 2022-10-21
Payer: MEDICARE

## 2022-10-21 VITALS
DIASTOLIC BLOOD PRESSURE: 82 MMHG | SYSTOLIC BLOOD PRESSURE: 116 MMHG | HEIGHT: 64 IN | HEART RATE: 91 BPM | WEIGHT: 148 LBS | OXYGEN SATURATION: 97 % | TEMPERATURE: 97.5 F | BODY MASS INDEX: 25.27 KG/M2

## 2022-10-21 DIAGNOSIS — G89.29 CHRONIC LEFT-SIDED LOW BACK PAIN WITH LEFT-SIDED SCIATICA: ICD-10-CM

## 2022-10-21 DIAGNOSIS — E78.2 MIXED HYPERLIPIDEMIA: ICD-10-CM

## 2022-10-21 DIAGNOSIS — M54.42 CHRONIC LEFT-SIDED LOW BACK PAIN WITH LEFT-SIDED SCIATICA: ICD-10-CM

## 2022-10-21 DIAGNOSIS — R73.01 ELEVATED FASTING GLUCOSE: ICD-10-CM

## 2022-10-21 DIAGNOSIS — Z13.6 SCREENING FOR CARDIOVASCULAR CONDITION: ICD-10-CM

## 2022-10-21 DIAGNOSIS — M85.89 OSTEOPENIA OF MULTIPLE SITES: ICD-10-CM

## 2022-10-21 LAB
HBA1C MFR BLD: 5.8 % (ref 0–5.6)
INT CON NEG: ABNORMAL
INT CON POS: ABNORMAL

## 2022-10-21 PROCEDURE — 83036 HEMOGLOBIN GLYCOSYLATED A1C: CPT | Performed by: NURSE PRACTITIONER

## 2022-10-21 PROCEDURE — 99214 OFFICE O/P EST MOD 30 MIN: CPT | Performed by: NURSE PRACTITIONER

## 2022-10-21 RX ORDER — SIMVASTATIN 10 MG
10 TABLET ORAL NIGHTLY
Qty: 90 TABLET | Refills: 0 | Status: SHIPPED | OUTPATIENT
Start: 2022-10-21 | End: 2023-01-24

## 2022-10-21 ASSESSMENT — FIBROSIS 4 INDEX: FIB4 SCORE: 2.46

## 2022-10-21 NOTE — PATIENT INSTRUCTIONS
The bone density test shows bone thinning (osteopenia).  I would recommend calcium supplements at 1200 mg/day along with 2000 units of D3.  Also, weight bearing exercise 3 times/week is beneficial to keeping bones strong (walking, weight lifting)

## 2022-10-21 NOTE — PROGRESS NOTES
Subjective:     CC:   Chief Complaint   Patient presents with    Back Pain     Left side low back pain x 3 months        HPI:   Patient is a 77 y.o. female with a primary medical history of hyperlipidemia, osteopenia here today with complaints of low back pain. Also due for annual labs.     Problem   Elevated Fasting Glucose     Latest Reference Range & Units 12/8/21 07:44 10/11/22 09:35   Glucose 65 - 99 mg/dL 127 (H) 116 (H)        Chronic Left-Sided Low Back Pain With Left-Sided Sciatica    Reports left lower back pain off/on for several years. Seems to be getting worse the past 3 months. Radiates down her left leg sometimes. Pain is worse with prolonged sitting.     Osteopenia    Osteopenia on dexa in 1/2022  Taking daily Vit D + calcium; unsure of dosing, will provide this info today so she can check vitamins at home      FINDINGS:  lumbar spine T score of -2.1 and a Z score of -0.2.     proximal left femur T score of -1.9 and a Z score of 0.0.     IMPRESSION:   According to the World Health Organization classification, bone mineral density of this patient is consistent with osteopenia in the lumbar spine and proximal left femur.       Mixed Hyperlipidemia    Did not tolerate Atorvastatin in the past  Recheck on fasting lipid remains elevated  Open to try SImvastatin         Patient Active Problem List   Diagnosis    H/O hiatal hernia    Mixed hyperlipidemia    Post-menopausal    Osteopenia    Elevated fasting glucose    Chronic left-sided low back pain with left-sided sciatica       Past Medical History:   Diagnosis Date    Arthritis     Hips, feet    Breath shortness     when had hiatal hernia    Cataract     Surgery bilat    Heart burn     High cholesterol     Indigestion     GERD        Past Surgical History:   Procedure Laterality Date    LA LAP,ESOPHAGOGAST FUNDOPLASTY N/A 8/11/2021    Procedure: FUNDOPLICATION, PARTIAL, LAPAROSCOPIC - PARAESOPHAGEAL WITH MESH.;  Surgeon: Noble Arce M.D.;  Location:  "SURGERY [facility];  Service: General    OTHER  1978    Breast implants    GYN SURGERY  1976    Tubal ligation    TONSILLECTOMY  1947        Current Outpatient Medications on File Prior to Visit   Medication Sig Dispense Refill    acetaminophen (TYLENOL) 500 MG Tab Take 250 mg by mouth 1 time a day as needed. Couple times per week... OTC  Indications: Pain      olopatadine (PATANOL) 0.1 % ophthalmic solution Administer 1 Drop into both eyes 1 time a day as needed for Allergies. Through Opt... for optical rosacea.      Glycerin-Polysorbate 80 (REFRESH DRY EYE THERAPY OP) Administer 1 Drop into both eyes 1 time a day as needed.       No current facility-administered medications on file prior to visit.        Health Maintenance: Completed  - declined AWV today in lieu of addressing other concerns. Will complete at next visit    ROS:  Review of Systems   Constitutional: Negative.  Negative for fever and malaise/fatigue.   Respiratory:  Negative for cough, sputum production and shortness of breath.    Cardiovascular:  Negative for chest pain, palpitations and leg swelling.   Genitourinary: Negative.    Musculoskeletal:  Positive for back pain.   Neurological: Negative.    Endo/Heme/Allergies: Negative.      Objective:     Exam:  /82   Pulse 91   Temp 36.4 °C (97.5 °F) (Temporal)   Ht 1.626 m (5' 4\")   Wt 67.1 kg (148 lb)   SpO2 97%   BMI 25.40 kg/m²  Body mass index is 25.4 kg/m².    Physical Exam  Constitutional:       Appearance: Normal appearance.   Cardiovascular:      Rate and Rhythm: Normal rate and regular rhythm.      Pulses: Normal pulses.      Heart sounds: Normal heart sounds.   Pulmonary:      Effort: Pulmonary effort is normal.      Breath sounds: Normal breath sounds.   Musculoskeletal:         General: Normal range of motion.      Right lower leg: No edema.      Left lower leg: No edema.   Skin:     General: Skin is warm and dry.   Neurological:      General: No focal deficit present.      " Mental Status: She is alert and oriented to person, place, and time.     Labs: reviewed    Assessment & Plan:     77 y.o. female with the following -     Problem List Items Addressed This Visit       Mixed hyperlipidemia      Latest Reference Range & Units 12/8/21 07:44 10/11/22 09:35   Cholesterol,Tot 100 - 199 mg/dL 163 317 (H)   Triglycerides 0 - 149 mg/dL 130 182 (H)   HDL >=40 mg/dL 61 48   LDL <100 mg/dL 76 233 (H)   - increase in lipid profile once she stopped taking Atorvastatin. States she stopped taking because it elevated FBG. This was rechecked recently and remains elevated even thought she stopped taking atorvastatin 6 months ago  The 10-year ASCVD risk score (Dane ORTIZ, et al., 2019) is: 19.4%    She understands cardiovascular risks. Willing to try low dose simvastatin  - start Simvastatin 10mg QHS with daily COQ10  - check fasting lipid with CMP in 6 months         Relevant Medications    simvastatin (ZOCOR) 10 MG Tab    Other Relevant Orders    CBC WITHOUT DIFFERENTIAL    Comp Metabolic Panel    Lipid Profile    TSH WITH REFLEX TO FT4    Osteopenia     No recent falls or fractures  - continue daily supplements with vit D & calcium  - getting referral to PT for strength building         Relevant Orders    CBC WITHOUT DIFFERENTIAL    TSH WITH REFLEX TO FT4    Elevated fasting glucose     Elevated FBG on 10/11/2022;   A1C in clinic today 5.8  - counseled on eliminating food items with added sugars & processed carbs  - monitor annual CMP, fasting lipid, A1C         Relevant Orders    POCT  A1C (Completed)    Chronic left-sided low back pain with left-sided sciatica     Chronic back pain with left leg sciatica. Maintains full ROM on exam today; no tenderness with spinal palpation, CVAT -ve  - will start with conservative measures. She will try topical analgesics like voltaren prn  - referral placed for her to start PT ROM/core strengthening exercises         Relevant Orders    Referral to Physical Therapy      Other Visit Diagnoses       Screening for cardiovascular condition        Relevant Orders    CBC WITHOUT DIFFERENTIAL    Comp Metabolic Panel    Lipid Profile    TSH WITH REFLEX TO FT4            Medications Prescribed Today:  2. Mixed hyperlipidemia  - simvastatin (ZOCOR) 10 MG Tab; Take 1 Tablet by mouth every evening.  Dispense: 90 Tablet; Refill: 0    Educated in proper administration of medication(s) ordered today including safety, possible SE, risks, benefits, rationale and alternatives to therapy.     Return in about 3 months (around 1/21/2023).    Please note that this dictation was created using voice recognition software. I have made every reasonable attempt to correct obvious errors, but I expect that there are errors of grammar and possibly content that I did not discover before finalizing the note.

## 2022-10-24 PROBLEM — R73.01 ELEVATED FASTING GLUCOSE: Status: ACTIVE | Noted: 2022-10-24

## 2022-10-24 PROBLEM — M54.42 CHRONIC LEFT-SIDED LOW BACK PAIN WITH LEFT-SIDED SCIATICA: Status: ACTIVE | Noted: 2022-10-24

## 2022-10-24 PROBLEM — G89.29 CHRONIC LEFT-SIDED LOW BACK PAIN WITH LEFT-SIDED SCIATICA: Status: ACTIVE | Noted: 2022-10-24

## 2022-10-24 ASSESSMENT — ENCOUNTER SYMPTOMS
SPUTUM PRODUCTION: 0
SHORTNESS OF BREATH: 0
CONSTITUTIONAL NEGATIVE: 1
BACK PAIN: 1
COUGH: 0
FEVER: 0
PALPITATIONS: 0
NEUROLOGICAL NEGATIVE: 1

## 2022-10-24 NOTE — ASSESSMENT & PLAN NOTE
No recent falls or fractures  - continue daily supplements with vit D & calcium  - getting referral to PT for strength building

## 2022-10-24 NOTE — ASSESSMENT & PLAN NOTE
Latest Reference Range & Units 12/8/21 07:44 10/11/22 09:35   Cholesterol,Tot 100 - 199 mg/dL 163 317 (H)   Triglycerides 0 - 149 mg/dL 130 182 (H)   HDL >=40 mg/dL 61 48   LDL <100 mg/dL 76 233 (H)     - increase in lipid profile once she stopped taking Atorvastatin. States she stopped taking because it elevated FBG. This was rechecked recently and remains elevated even thought she stopped taking atorvastatin 6 months ago  The 10-year ASCVD risk score (Dane DK, et al., 2019) is: 19.4%    She understands cardiovascular risks. Willing to try low dose simvastatin  - start Simvastatin 10mg QHS with daily COQ10  - check fasting lipid with CMP in 6 months

## 2022-10-24 NOTE — ASSESSMENT & PLAN NOTE
Chronic back pain with left leg sciatica. Maintains full ROM on exam today; no tenderness with spinal palpation, CVAT -ve  - will start with conservative measures. She will try topical analgesics like voltaren prn  - referral placed for her to start PT ROM/core strengthening exercises

## 2022-10-24 NOTE — ASSESSMENT & PLAN NOTE
Elevated FBG on 10/11/2022;   A1C in clinic today 5.8  - counseled on eliminating food items with added sugars & processed carbs  - monitor annual CMP, fasting lipid, A1C

## 2022-11-03 ENCOUNTER — PATIENT MESSAGE (OUTPATIENT)
Dept: HEALTH INFORMATION MANAGEMENT | Facility: OTHER | Age: 77
End: 2022-11-03

## 2022-11-04 ENCOUNTER — PATIENT MESSAGE (OUTPATIENT)
Dept: MEDICAL GROUP | Facility: PHYSICIAN GROUP | Age: 77
End: 2022-11-04
Payer: MEDICARE

## 2022-11-04 DIAGNOSIS — G89.29 CHRONIC LEFT-SIDED LOW BACK PAIN WITH LEFT-SIDED SCIATICA: ICD-10-CM

## 2022-11-04 DIAGNOSIS — M54.42 CHRONIC LEFT-SIDED LOW BACK PAIN WITH LEFT-SIDED SCIATICA: ICD-10-CM

## 2022-11-04 NOTE — PROGRESS NOTES
1. Chronic left-sided low back pain with left-sided sciatica  - DX-LUMBAR SPINE-2 OR 3 VIEWS; Future

## 2022-11-07 ENCOUNTER — APPOINTMENT (OUTPATIENT)
Dept: RADIOLOGY | Facility: MEDICAL CENTER | Age: 77
End: 2022-11-07
Attending: NURSE PRACTITIONER
Payer: MEDICARE

## 2022-11-07 DIAGNOSIS — G89.29 CHRONIC LEFT-SIDED LOW BACK PAIN WITH LEFT-SIDED SCIATICA: ICD-10-CM

## 2022-11-07 DIAGNOSIS — M54.42 CHRONIC LEFT-SIDED LOW BACK PAIN WITH LEFT-SIDED SCIATICA: ICD-10-CM

## 2022-11-07 PROCEDURE — 72100 X-RAY EXAM L-S SPINE 2/3 VWS: CPT

## 2022-11-30 ENCOUNTER — TELEPHONE (OUTPATIENT)
Dept: MEDICAL GROUP | Facility: PHYSICIAN GROUP | Age: 77
End: 2022-11-30
Payer: MEDICARE

## 2022-11-30 NOTE — TELEPHONE ENCOUNTER
Hello,    FYI -    MA made an error on POCT A1C control. Your pt. Had a A!C done at visit. Result may not be accurate due to the POCT control error and not rerunning control.     Thank you-

## 2022-12-14 ENCOUNTER — APPOINTMENT (RX ONLY)
Dept: URBAN - METROPOLITAN AREA CLINIC 22 | Facility: CLINIC | Age: 77
Setting detail: DERMATOLOGY
End: 2022-12-14

## 2022-12-14 DIAGNOSIS — L40.0 PSORIASIS VULGARIS: ICD-10-CM

## 2022-12-14 PROBLEM — L30.9 DERMATITIS, UNSPECIFIED: Status: ACTIVE | Noted: 2022-12-14

## 2022-12-14 PROCEDURE — ? PRESCRIPTION

## 2022-12-14 PROCEDURE — 11102 TANGNTL BX SKIN SINGLE LES: CPT

## 2022-12-14 PROCEDURE — 99214 OFFICE O/P EST MOD 30 MIN: CPT | Mod: 25

## 2022-12-14 PROCEDURE — ? PRESCRIPTION SAMPLES PROVIDED

## 2022-12-14 PROCEDURE — ? BIOPSY BY SHAVE METHOD

## 2022-12-14 PROCEDURE — ? COUNSELING

## 2022-12-14 RX ORDER — TAPINAROF 10 MG/1000MG
CREAM TOPICAL
Qty: 60 | Refills: 1 | Status: ERX | COMMUNITY
Start: 2022-12-14

## 2022-12-14 RX ADMIN — TAPINAROF: 10 CREAM TOPICAL at 00:00

## 2022-12-14 ASSESSMENT — LOCATION SIMPLE DESCRIPTION DERM
LOCATION SIMPLE: LEFT EAR
LOCATION SIMPLE: RIGHT PRETIBIAL REGION
LOCATION SIMPLE: LEFT PRETIBIAL REGION
LOCATION SIMPLE: RIGHT SMALL FINGER
LOCATION SIMPLE: RIGHT EAR

## 2022-12-14 ASSESSMENT — LOCATION DETAILED DESCRIPTION DERM
LOCATION DETAILED: LEFT PROXIMAL PRETIBIAL REGION
LOCATION DETAILED: RIGHT PROXIMAL PRETIBIAL REGION
LOCATION DETAILED: RIGHT PROXIMAL DORSAL SMALL FINGER
LOCATION DETAILED: RIGHT INFERIOR CRUS OF ANTIHELIX
LOCATION DETAILED: LEFT ANTIHELIX

## 2022-12-14 ASSESSMENT — LOCATION ZONE DERM
LOCATION ZONE: LEG
LOCATION ZONE: FINGER
LOCATION ZONE: EAR

## 2022-12-14 ASSESSMENT — PGA PSORIASIS: PGA PSORIASIS 2020: MILD

## 2022-12-23 ENCOUNTER — RX ONLY (OUTPATIENT)
Age: 77
Setting detail: RX ONLY
End: 2022-12-23

## 2022-12-23 RX ORDER — CLOBETASOL PROPIONATE 0.5 MG/G
CREAM TOPICAL
Qty: 45 | Refills: 0 | Status: ERX | COMMUNITY
Start: 2022-12-22

## 2023-01-16 ENCOUNTER — HOSPITAL ENCOUNTER (OUTPATIENT)
Dept: LAB | Facility: MEDICAL CENTER | Age: 78
End: 2023-01-16
Attending: NURSE PRACTITIONER
Payer: MEDICARE

## 2023-01-16 DIAGNOSIS — Z13.6 SCREENING FOR CARDIOVASCULAR CONDITION: ICD-10-CM

## 2023-01-16 DIAGNOSIS — M85.89 OSTEOPENIA OF MULTIPLE SITES: ICD-10-CM

## 2023-01-16 DIAGNOSIS — E78.2 MIXED HYPERLIPIDEMIA: ICD-10-CM

## 2023-01-16 LAB
ALBUMIN SERPL BCP-MCNC: 4.4 G/DL (ref 3.2–4.9)
ALBUMIN/GLOB SERPL: 1.8 G/DL
ALP SERPL-CCNC: 66 U/L (ref 30–99)
ALT SERPL-CCNC: 28 U/L (ref 2–50)
ANION GAP SERPL CALC-SCNC: 11 MMOL/L (ref 7–16)
AST SERPL-CCNC: 25 U/L (ref 12–45)
BILIRUB SERPL-MCNC: 0.7 MG/DL (ref 0.1–1.5)
BUN SERPL-MCNC: 19 MG/DL (ref 8–22)
CALCIUM ALBUM COR SERPL-MCNC: 9.3 MG/DL (ref 8.5–10.5)
CALCIUM SERPL-MCNC: 9.6 MG/DL (ref 8.5–10.5)
CHLORIDE SERPL-SCNC: 104 MMOL/L (ref 96–112)
CHOLEST SERPL-MCNC: 217 MG/DL (ref 100–199)
CO2 SERPL-SCNC: 26 MMOL/L (ref 20–33)
CREAT SERPL-MCNC: 0.57 MG/DL (ref 0.5–1.4)
ERYTHROCYTE [DISTWIDTH] IN BLOOD BY AUTOMATED COUNT: 43 FL (ref 35.9–50)
FASTING STATUS PATIENT QL REPORTED: NORMAL
GFR SERPLBLD CREATININE-BSD FMLA CKD-EPI: 92 ML/MIN/1.73 M 2
GLOBULIN SER CALC-MCNC: 2.5 G/DL (ref 1.9–3.5)
GLUCOSE SERPL-MCNC: 128 MG/DL (ref 65–99)
HCT VFR BLD AUTO: 47.9 % (ref 37–47)
HDLC SERPL-MCNC: 56 MG/DL
HGB BLD-MCNC: 15.9 G/DL (ref 12–16)
LDLC SERPL CALC-MCNC: 128 MG/DL
MCH RBC QN AUTO: 30.5 PG (ref 27–33)
MCHC RBC AUTO-ENTMCNC: 33.2 G/DL (ref 33.6–35)
MCV RBC AUTO: 91.9 FL (ref 81.4–97.8)
PLATELET # BLD AUTO: 184 K/UL (ref 164–446)
PMV BLD AUTO: 10.4 FL (ref 9–12.9)
POTASSIUM SERPL-SCNC: 4.7 MMOL/L (ref 3.6–5.5)
PROT SERPL-MCNC: 6.9 G/DL (ref 6–8.2)
RBC # BLD AUTO: 5.21 M/UL (ref 4.2–5.4)
SODIUM SERPL-SCNC: 141 MMOL/L (ref 135–145)
TRIGL SERPL-MCNC: 166 MG/DL (ref 0–149)
TSH SERPL DL<=0.005 MIU/L-ACNC: 1.29 UIU/ML (ref 0.38–5.33)
WBC # BLD AUTO: 4.6 K/UL (ref 4.8–10.8)

## 2023-01-16 PROCEDURE — 80053 COMPREHEN METABOLIC PANEL: CPT

## 2023-01-16 PROCEDURE — 85027 COMPLETE CBC AUTOMATED: CPT

## 2023-01-16 PROCEDURE — 36415 COLL VENOUS BLD VENIPUNCTURE: CPT

## 2023-01-16 PROCEDURE — 84443 ASSAY THYROID STIM HORMONE: CPT

## 2023-01-16 PROCEDURE — 80061 LIPID PANEL: CPT

## 2023-01-19 ENCOUNTER — APPOINTMENT (RX ONLY)
Dept: URBAN - METROPOLITAN AREA CLINIC 22 | Facility: CLINIC | Age: 78
Setting detail: DERMATOLOGY
End: 2023-01-19

## 2023-01-19 DIAGNOSIS — M33.1 OTHER DERMATOMYOSITIS: ICD-10-CM | Status: INADEQUATELY CONTROLLED

## 2023-01-19 PROBLEM — L30.9 DERMATITIS, UNSPECIFIED: Status: ACTIVE | Noted: 2023-01-19

## 2023-01-19 PROCEDURE — 99214 OFFICE O/P EST MOD 30 MIN: CPT

## 2023-01-19 PROCEDURE — ? PRESCRIPTION

## 2023-01-19 PROCEDURE — ? ADDITIONAL NOTES

## 2023-01-19 PROCEDURE — ? COUNSELING

## 2023-01-19 PROCEDURE — ? ORDER TESTS

## 2023-01-19 RX ORDER — CLOBETASOL PROPIONATE 0.5 MG/G
CREAM TOPICAL BID
Qty: 30 | Refills: 1 | Status: ERX

## 2023-01-19 ASSESSMENT — LOCATION SIMPLE DESCRIPTION DERM: LOCATION SIMPLE: RIGHT INDEX FINGER

## 2023-01-19 ASSESSMENT — LOCATION DETAILED DESCRIPTION DERM: LOCATION DETAILED: RIGHT INDEX PROXIMAL INTERPHALANGEAL JOINT

## 2023-01-19 ASSESSMENT — SEVERITY ASSESSMENT: SEVERITY: MILD

## 2023-01-19 ASSESSMENT — LOCATION ZONE DERM: LOCATION ZONE: FINGER

## 2023-01-19 NOTE — PROCEDURE: ORDER TESTS
Bill For Surgical Tray: no
Performing Laboratory: [id number]
Lab Facility: [id number]
Billing Type: Third-Party Bill
Expected Date Of Service: 01/19/2023

## 2023-01-19 NOTE — PROCEDURE: ADDITIONAL NOTES
Render Risk Assessment In Note?: no
Detail Level: Generalized
Additional Notes: Consulted with Dr Anderson.  We reviewed path and photos.  She will continue Clobetasol cream twice daily x 2-3 weeks as needed.  Complete labs:  HARIS with reflex if positive.  CMP and CBC recently completed through pcp. Request results.

## 2023-01-19 NOTE — HPI: RASH (SYSTEMIC LUPUS ERYTHEMATOSUS)
How Severe Is It?: moderate
Is This A New Presentation, Or A Follow-Up?: Rash
Additional History: Rash has improved with topical Clobetasol

## 2023-01-20 ENCOUNTER — OFFICE VISIT (OUTPATIENT)
Dept: MEDICAL GROUP | Facility: PHYSICIAN GROUP | Age: 78
End: 2023-01-20
Payer: MEDICARE

## 2023-01-20 VITALS
RESPIRATION RATE: 16 BRPM | OXYGEN SATURATION: 97 % | HEART RATE: 90 BPM | SYSTOLIC BLOOD PRESSURE: 118 MMHG | WEIGHT: 156 LBS | TEMPERATURE: 96.8 F | DIASTOLIC BLOOD PRESSURE: 80 MMHG | BODY MASS INDEX: 26.78 KG/M2

## 2023-01-20 DIAGNOSIS — E78.2 MIXED HYPERLIPIDEMIA: ICD-10-CM

## 2023-01-20 DIAGNOSIS — R73.01 ELEVATED FASTING GLUCOSE: ICD-10-CM

## 2023-01-20 DIAGNOSIS — M85.89 OSTEOPENIA OF MULTIPLE SITES: ICD-10-CM

## 2023-01-20 DIAGNOSIS — Z00.00 ENCOUNTER FOR ANNUAL WELLNESS VISIT (AWV) IN MEDICARE PATIENT: ICD-10-CM

## 2023-01-20 PROCEDURE — G0439 PPPS, SUBSEQ VISIT: HCPCS | Performed by: NURSE PRACTITIONER

## 2023-01-20 ASSESSMENT — ENCOUNTER SYMPTOMS: GENERAL WELL-BEING: GOOD

## 2023-01-20 ASSESSMENT — PATIENT HEALTH QUESTIONNAIRE - PHQ9: CLINICAL INTERPRETATION OF PHQ2 SCORE: 0

## 2023-01-20 ASSESSMENT — ACTIVITIES OF DAILY LIVING (ADL): BATHING_REQUIRES_ASSISTANCE: 0

## 2023-01-20 ASSESSMENT — FIBROSIS 4 INDEX: FIB4 SCORE: 2.03

## 2023-01-20 NOTE — PROGRESS NOTES
Chief Complaint   Patient presents with    Annual Exam     AWV       HPI:  Shelby Mireles is a 77 y.o. here for Medicare Annual Wellness Visit. She is doing well today and has no active complaints.    Patient Active Problem List    Diagnosis Date Noted    Elevated fasting glucose 10/24/2022    Chronic left-sided low back pain with left-sided sciatica 10/24/2022    Osteopenia 12/13/2021    Post-menopausal 09/27/2021    H/O hiatal hernia 05/21/2021    Mixed hyperlipidemia 05/21/2021       Current Outpatient Medications   Medication Sig Dispense Refill    simvastatin (ZOCOR) 10 MG Tab Take 1 Tablet by mouth every evening. 90 Tablet 0    acetaminophen (TYLENOL) 500 MG Tab Take 250 mg by mouth 1 time a day as needed. Couple times per week... OTC  Indications: Pain      olopatadine (PATANOL) 0.1 % ophthalmic solution Administer 1 Drop into both eyes 1 time a day as needed for Allergies. Through Opt... for optical rosacea.      Glycerin-Polysorbate 80 (REFRESH DRY EYE THERAPY OP) Administer 1 Drop into both eyes 1 time a day as needed.       No current facility-administered medications for this visit.          Current supplements as per medication list.     Allergies: Lipitor [atorvastatin] and Tetracycline    Current social contact/activities: weaving, walking,      She  reports that she has never smoked. She has never used smokeless tobacco. She reports that she does not currently use alcohol. She reports that she does not currently use drugs.  Counseling given: Not Answered      ROS:    Gait: Uses no assistive device  Ostomy: No  Other tubes: No  Amputations: No  Chronic oxygen use: No  Last eye exam: 2023  Wears hearing aids: No   : Denies any urinary leakage during the last 6 months    Screening:  Depression Screening  Little interest or pleasure in doing things?  0 - not at all  Feeling down, depressed , or hopeless? 0 - not at all  Patient Health Questionnaire Score: 0     If depressive symptoms identified  deferred to follow up visit unless specifically addressed in assessment and plan.    Interpretation of PHQ-9 Total Score   Score Severity   1-4 No Depression   5-9 Mild Depression   10-14 Moderate Depression   15-19 Moderately Severe Depression   20-27 Severe Depression    Screening for Cognitive Impairment  Three Minute Recall (daughter, heaven, mountain) 3/3    Neal clock face with all 12 numbers and set the hands to show 10 past 11.  Yes    Cognitive concerns identified deferred for follow up unless specifically addressed in assessment and plan.    Fall Risk Assessment  Has the patient had two or more falls in the last year or any fall with injury in the last year?  No    Safety Assessment  Throw rugs on floor.  Yes  Handrails on all stairs.  Yes  Good lighting in all hallways.  Yes  Difficulty hearing.  Yes  Patient counseled about all safety risks that were identified.    Functional Assessment ADLs  Are there any barriers preventing you from cooking for yourself or meeting nutritional needs?  No.    Are there any barriers preventing you from driving safely or obtaining transportation?  No.    Are there any barriers preventing you from using a telephone or calling for help?  No.    Are there any barriers preventing you from shopping?  No.    Are there any barriers preventing you from taking care of your own finances?  No.    Are there any barriers preventing you from managing your medications?  No.    Are there any barriers preventing you from showering, bathing or dressing yourself?  No.    Are you currently engaging in any exercise or physical activity?  No.     What is your perception of your health?  Good    Advance Care Planning  Do you have an Advance Directive, Living Will, Durable Power of , or POLST? Yes  Advance Directive Living Will Durable Power of  POLST is on file      Health Maintenance Summary            Overdue - Annual Wellness Visit (Every 366 Days) Overdue - never done       No completion history exists for this topic.              IMM DTaP/Tdap/Td Vaccine (2 - Tdap) Next due on 1/1/2026 01/01/2016  Imm Admin: Dtap Vaccine              BONE DENSITY (Every 5 Years) Next due on 1/3/2027      01/03/2022  DS-BONE DENSITY STUDY (DEXA)              IMM ZOSTER VACCINES (Series Information) Completed      11/26/2019  Imm Admin: Zoster Vaccine Recombinant (RZV) (SHINGRIX)    09/24/2019  Imm Admin: Zoster Vaccine Recombinant (RZV) (SHINGRIX)              IMM INFLUENZA (Series Information) Completed      08/31/2022  Imm Admin: Influenza, Unspecified - HISTORICAL DATA    09/07/2021  Imm Admin: Influenza Vaccine Quad Inj (Pf)    08/20/2020  Imm Admin: Influenza Vaccine Quad Inj (Pf)    10/30/2019  Imm Admin: Influenza, Unspecified - HISTORICAL DATA              COVID-19 Vaccine (Series Information) Completed      10/19/2022  Outside Immunization: COVID mRNA Bivalent(MOD)    04/04/2022  Imm Admin: MODERNA SARS-COV-2 VACCINE (12+)    10/27/2021  Imm Admin: MODERNA SARS-COV-2 VACCINE (12+)    03/13/2021  Imm Admin: MODERNA SARS-COV-2 VACCINE (12+)    02/12/2021  Imm Admin: MODERNA SARS-COV-2 VACCINE (12+)              IMM HEP B VACCINE (Series Information) Aged Out      No completion history exists for this topic.              IMM MENINGOCOCCAL ACWY VACCINE (Series Information) Aged Out      No completion history exists for this topic.              Discontinued - COLORECTAL CANCER SCREENING  Discontinued        Frequency changed to Never automatically (Topic No Longer Applies)    06/21/2021  REFERRAL TO GI FOR COLONOSCOPY              Discontinued - HEPATITIS C SCREENING  Discontinued      No completion history exists for this topic.              Discontinued - IMM PNEUMOCOCCAL VACCINE: 65+ Years  Discontinued      12/13/2021  Imm Admin: Pneumococcal polysaccharide vaccine (PPSV-23)                    Patient Care Team:  Ashley Andersen D.N.P. as PCP - General (Nurse Practitioner  Family)        Social History     Tobacco Use    Smoking status: Never    Smokeless tobacco: Never   Vaping Use    Vaping Use: Never used   Substance Use Topics    Alcohol use: Not Currently    Drug use: Not Currently     Family History   Problem Relation Age of Onset    No Known Problems Mother     Heart Disease Father         began in his 40's    Parkinson's Disease Father     Cancer Maternal Aunt         pancreatic cancer    Cancer Paternal Uncle         bladder    Cancer Maternal Grandmother         breast cancer    Heart Disease Paternal Grandfather         CHF    No Known Problems Daughter     No Known Problems Daughter      She  has a past medical history of Arthritis, Breath shortness, Cataract, Heart burn, High cholesterol, and Indigestion.   Past Surgical History:   Procedure Laterality Date    CT LAP,ESOPHAGOGAST FUNDOPLASTY N/A 8/11/2021    Procedure: FUNDOPLICATION, PARTIAL, LAPAROSCOPIC - PARAESOPHAGEAL WITH MESH.;  Surgeon: Noble Arce M.D.;  Location: SURGERY [facility];  Service: General    OTHER  1978    Breast implants    GYN SURGERY  1976    Tubal ligation    TONSILLECTOMY  1947       Exam:   /80   Pulse 90   Temp 36 °C (96.8 °F)   Resp 16   Wt 70.8 kg (156 lb)   SpO2 97%  Body mass index is 26.78 kg/m².    Hearing good.    Dentition good  Alert, oriented in no acute distress.  Eye contact is good, speech goal directed, affect calm    Physical Exam  Constitutional:       Appearance: Normal appearance.   Cardiovascular:      Rate and Rhythm: Normal rate and regular rhythm.      Pulses: Normal pulses.      Heart sounds: Normal heart sounds.   Pulmonary:      Effort: Pulmonary effort is normal.      Breath sounds: Normal breath sounds.   Musculoskeletal:         General: Normal range of motion.      Right lower leg: No edema.      Left lower leg: No edema.   Skin:     General: Skin is warm and dry.   Neurological:      General: No focal deficit present.      Mental Status: She is  alert and oriented to person, place, and time.   Psychiatric:         Mood and Affect: Mood normal.         Behavior: Behavior normal.         Thought Content: Thought content normal.         Judgment: Judgment normal.      Assessment and Plan. The following treatment and monitoring plan is recommended:    1. Elevated fasting glucose  Elevated FBG on 10/11/2022;   A1C 5.8 in  10/2022  - counseled on eliminating food items with added sugars & processed carbs  - monitor annual CMP, fasting lipid, A1C    2. Mixed hyperlipidemia   Latest Reference Range & Units 12/08/21 07:44 10/11/22 09:35 01/16/23 09:26   Cholesterol,Tot 100 - 199 mg/dL 163 317 (H) 217 (H)   Triglycerides 0 - 149 mg/dL 130 182 (H) 166 (H)   HDL >=40 mg/dL 61 48 56   LDL <100 mg/dL 76 233 (H) 128 (H)     - increase in lipid profile once she stopped taking Atorvastatin. States she stopped taking because it elevated FBG. This was rechecked recently and remains elevated even though she stopped taking atorvastatin 6 months ago  The 10-year ASCVD risk score (Dane DK, et al., 2019) is: 20.9%    She understands cardiovascular risks. Willing to try low dose simvastatin & this was added in 10/2022; tolerating  - continue Simvastatin 10mg QHS with daily COQ10  - counseled on heart healthy eating  - monitor fasting lipid with CMP Q6 months    3. Osteopenia of multiple sites  No recent falls or fractures  - continue daily supplements with vit D & calcium  - attending PT for strength building    4. Encounter for annual wellness visit (AWV) in Medicare patient  Services suggested: No services needed at this time  Health Care Screening: Age-appropriate preventive services recommended by USPTF and ACIP covered by Medicare were discussed today. Services ordered if indicated and agreed upon by the patient.  Referrals offered: Community-based lifestyle interventions to reduce health risks and promote self-management and wellness, fall prevention, nutrition, physical  activity, tobacco-use cessation, weight loss, and mental health services as per orders if indicated.    Discussion today about general wellness and lifestyle habits:    Prevent falls and reduce trip hazards; Cautioned about securing or removing rugs.  Have a working fire alarm and carbon monoxide detector;   Engage in regular physical activity and social activities     Follow-up: Return in about 3 months (around 4/20/2023) for prediabetes.

## 2023-01-22 RX ORDER — TAPINAROF 10 MG/1000MG
CREAM TOPICAL
COMMUNITY
Start: 2023-01-04 | End: 2023-04-21

## 2023-01-23 DIAGNOSIS — E78.2 MIXED HYPERLIPIDEMIA: ICD-10-CM

## 2023-01-23 NOTE — ASSESSMENT & PLAN NOTE
Elevated FBG on 10/11/2022;   A1C 5.8 in  10/2022  - counseled on eliminating food items with added sugars & processed carbs  - monitor annual CMP, fasting lipid, A1C

## 2023-01-23 NOTE — ASSESSMENT & PLAN NOTE
Latest Reference Range & Units 12/08/21 07:44 10/11/22 09:35 01/16/23 09:26   Cholesterol,Tot 100 - 199 mg/dL 163 317 (H) 217 (H)   Triglycerides 0 - 149 mg/dL 130 182 (H) 166 (H)   HDL >=40 mg/dL 61 48 56   LDL <100 mg/dL 76 233 (H) 128 (H)     - increase in lipid profile once she stopped taking Atorvastatin. States she stopped taking because it elevated FBG. This was rechecked recently and remains elevated even though she stopped taking atorvastatin 6 months ago  The 10-year ASCVD risk score (Dane ORTIZ, et al., 2019) is: 20.9%    She understands cardiovascular risks. Willing to try low dose simvastatin & this was added in 10/2022; tolerating  - continue Simvastatin 10mg QHS with daily COQ10  - counseled on heart healthy eating  - monitor fasting lipid with CMP Q6 months

## 2023-01-24 ENCOUNTER — HOSPITAL ENCOUNTER (OUTPATIENT)
Dept: LAB | Facility: MEDICAL CENTER | Age: 78
End: 2023-01-24
Attending: PHYSICIAN ASSISTANT
Payer: MEDICARE

## 2023-01-24 PROCEDURE — 86038 ANTINUCLEAR ANTIBODIES: CPT

## 2023-01-24 PROCEDURE — 36415 COLL VENOUS BLD VENIPUNCTURE: CPT

## 2023-01-24 RX ORDER — SIMVASTATIN 10 MG
TABLET ORAL
Qty: 90 TABLET | Refills: 3 | Status: SHIPPED
Start: 2023-01-24 | End: 2023-04-21

## 2023-01-26 LAB — NUCLEAR IGG SER QL IA: NORMAL

## 2023-04-21 ENCOUNTER — OFFICE VISIT (OUTPATIENT)
Dept: MEDICAL GROUP | Facility: PHYSICIAN GROUP | Age: 78
End: 2023-04-21
Payer: MEDICARE

## 2023-04-21 VITALS
SYSTOLIC BLOOD PRESSURE: 116 MMHG | HEIGHT: 64 IN | WEIGHT: 159 LBS | BODY MASS INDEX: 27.14 KG/M2 | TEMPERATURE: 97.7 F | DIASTOLIC BLOOD PRESSURE: 78 MMHG | OXYGEN SATURATION: 100 % | HEART RATE: 71 BPM

## 2023-04-21 DIAGNOSIS — E11.9 TYPE 2 DIABETES MELLITUS WITHOUT COMPLICATION, WITHOUT LONG-TERM CURRENT USE OF INSULIN (HCC): ICD-10-CM

## 2023-04-21 DIAGNOSIS — E78.2 MIXED HYPERLIPIDEMIA: ICD-10-CM

## 2023-04-21 PROCEDURE — 99214 OFFICE O/P EST MOD 30 MIN: CPT | Performed by: NURSE PRACTITIONER

## 2023-04-21 RX ORDER — METFORMIN HYDROCHLORIDE 500 MG/1
500 TABLET, EXTENDED RELEASE ORAL DAILY
Qty: 90 TABLET | Refills: 3 | Status: SHIPPED | OUTPATIENT
Start: 2023-04-21 | End: 2023-11-01 | Stop reason: SDUPTHER

## 2023-04-21 ASSESSMENT — ENCOUNTER SYMPTOMS
EYES NEGATIVE: 1
CONSTITUTIONAL NEGATIVE: 1
SPUTUM PRODUCTION: 0
PSYCHIATRIC NEGATIVE: 1
FEVER: 0
GASTROINTESTINAL NEGATIVE: 1
MUSCULOSKELETAL NEGATIVE: 1
NEUROLOGICAL NEGATIVE: 1
COUGH: 0
SHORTNESS OF BREATH: 0
PALPITATIONS: 0

## 2023-04-21 ASSESSMENT — FIBROSIS 4 INDEX: FIB4 SCORE: 2.05

## 2023-04-21 NOTE — ASSESSMENT & PLAN NOTE
A1C today in clinic 6.6 (up from 5.8).   - start Metformin 500mg ER QD; may start dose every other day and if tolerating can increase to daily; we also d/seema simvastatin due to myalgia; she noted elevated FBG when she was on atorvastatin.   - counseled on eliminating food items with added sugars & processed carbs  - recheck A1C in 3 months

## 2023-04-21 NOTE — ASSESSMENT & PLAN NOTE
Latest Reference Range & Units 12/08/21 07:44 10/11/22 09:35 01/16/23 09:26   Cholesterol,Tot 100 - 199 mg/dL 163 317 (H) 217 (H)   Triglycerides 0 - 149 mg/dL 130 182 (H) 166 (H)   HDL >=40 mg/dL 61 48 56   LDL <100 mg/dL 76 233 (H) 128 (H)     - increase in lipid profile once she stopped taking Atorvastatin. States she stopped taking because it elevated FBG. This was rechecked recently and remains elevated even though she stopped taking atorvastatin 6 months ago    She understands cardiovascular risks. Willing to try low dose simvastatin & this was added in 10/2022; tolerating up until recent month, states having muscle aches; also A1c has gone up   - d/c Simvastatin; continue with daily COQ10  - counseled on heart healthy eating  - monitor fasting lipid with CMP Q6 months

## 2023-04-21 NOTE — PROGRESS NOTES
Subjective       CC:   Chief Complaint   Patient presents with    Follow-Up     Pre diabetes, stop statin medication.         HPI:   Patient is a 78 y.o. established female patient with medical history listed below here today for evaluation and management of prediabetes. She has been working on dietary changes. We will recheck her A1C today. She started having muscle aches from simvastatin and stopped taking it.     Problem   Type 2 Diabetes Mellitus Without Complication, Without Long-Term Current Use of Insulin (Hcc)   Mixed Hyperlipidemia    Did not tolerate Atorvastatin in the past  Recheck on fasting lipid remains elevated; she tried simvastatin & did not tolerate that either. She would like to work on dietary changes.          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-sided sciatica       Past Medical History:   Diagnosis Date    Arthritis     Hips, feet    Breath shortness     when had hiatal hernia    Cataract     Surgery bilat    Heart burn     High cholesterol     Indigestion     GERD        Past Surgical History:   Procedure Laterality Date    SD LAP,ESOPHAGOGAST FUNDOPLASTY N/A 8/11/2021    Procedure: FUNDOPLICATION, PARTIAL, LAPAROSCOPIC - PARAESOPHAGEAL WITH MESH.;  Surgeon: Noble Arce M.D.;  Location: SURGERY [facility];  Service: General    OTHER  1978    Breast implants    GYN SURGERY  1976    Tubal ligation    TONSILLECTOMY  1947        Current Outpatient Medications on File Prior to Visit   Medication Sig Dispense Refill    acetaminophen (TYLENOL) 500 MG Tab Take 250 mg by mouth 1 time a day as needed. Couple times per week... OTC  Indications: Pain      olopatadine (PATANOL) 0.1 % ophthalmic solution Administer 1 Drop into both eyes 1 time a day as needed for Allergies.  "Through Opt... for optical rosacea.      Glycerin-Polysorbate 80 (REFRESH DRY EYE THERAPY OP) Administer 1 Drop into both eyes 1 time a day as needed.       No current facility-administered medications on file prior to visit.        Health Maintenance: Completed    ROS:  Review of Systems   Constitutional: Negative.  Negative for fever and malaise/fatigue.   HENT: Negative.     Eyes: Negative.    Respiratory:  Negative for cough, sputum production and shortness of breath.    Cardiovascular:  Negative for chest pain, palpitations and leg swelling.   Gastrointestinal: Negative.    Genitourinary: Negative.    Musculoskeletal: Negative.    Neurological: Negative.    Endo/Heme/Allergies: Negative.    Psychiatric/Behavioral: Negative.       Objective       Exam:  /78   Pulse 71   Temp 36.5 °C (97.7 °F) (Temporal)   Ht 1.626 m (5' 4\")   Wt 72.1 kg (159 lb)   SpO2 100%   BMI 27.29 kg/m²  Body mass index is 27.29 kg/m².    Physical Exam  Constitutional:       Appearance: Normal appearance.   Cardiovascular:      Rate and Rhythm: Normal rate and regular rhythm.      Pulses: Normal pulses.      Heart sounds: Normal heart sounds.   Pulmonary:      Effort: Pulmonary effort is normal.      Breath sounds: Normal breath sounds.   Musculoskeletal:      Right lower leg: No edema.      Left lower leg: No edema.   Skin:     General: Skin is warm and dry.   Neurological:      General: No focal deficit present.      Mental Status: She is alert and oriented to person, place, and time.       Assessment & Plan       78 y.o. female with the following -     Problem List Items Addressed This Visit       Mixed hyperlipidemia      Latest Reference Range & Units 12/08/21 07:44 10/11/22 09:35 01/16/23 09:26   Cholesterol,Tot 100 - 199 mg/dL 163 317 (H) 217 (H)   Triglycerides 0 - 149 mg/dL 130 182 (H) 166 (H)   HDL >=40 mg/dL 61 48 56   LDL <100 mg/dL 76 233 (H) 128 (H)   - increase in lipid profile once she stopped taking " Atorvastatin. States she stopped taking because it elevated FBG. This was rechecked recently and remains elevated even though she stopped taking atorvastatin 6 months ago    She understands cardiovascular risks. Willing to try low dose simvastatin & this was added in 10/2022; tolerating up until recent month, states having muscle aches; also A1c has gone up   - d/c Simvastatin; continue with daily COQ10  - counseled on heart healthy eating  - monitor fasting lipid with CMP Q6 months         Type 2 diabetes mellitus without complication, without long-term current use of insulin (HCC)     A1C today in clinic 6.6 (up from 5.8).   - start Metformin 500mg ER QD; may start dose every other day and if tolerating can increase to daily; we also d/seema simvastatin due to myalgia; she noted elevated FBG when she was on atorvastatin.   - counseled on eliminating food items with added sugars & processed carbs  - recheck A1C in 3 months          Relevant Medications    metFORMIN ER (GLUCOPHAGE XR) 500 MG TABLET SR 24 HR    Other Relevant Orders    POCT  A1C       Medications Prescribed Today:  1. Type 2 diabetes mellitus without complication, without long-term current use of insulin (HCC)  - metFORMIN ER (GLUCOPHAGE XR) 500 MG TABLET SR 24 HR; Take 1 Tablet by mouth every day.  Dispense: 90 Tablet; Refill: 3    Educated in proper administration of medication(s) ordered today including safety, possible SE, risks, benefits, rationale and alternatives to therapy.     Return in about 3 months (around 7/21/2023) for dm2 mgt, a1c recheck.    Please note that this dictation was created using voice recognition software. I have made every reasonable attempt to correct obvious errors, but I expect that there are errors of grammar and possibly content that I did not discover before finalizing the note.

## 2023-06-20 ENCOUNTER — TELEPHONE (OUTPATIENT)
Dept: HEALTH INFORMATION MANAGEMENT | Facility: OTHER | Age: 78
End: 2023-06-20

## 2023-07-27 ENCOUNTER — OFFICE VISIT (OUTPATIENT)
Dept: MEDICAL GROUP | Facility: PHYSICIAN GROUP | Age: 78
End: 2023-07-27
Payer: MEDICARE

## 2023-07-27 VITALS
HEIGHT: 64 IN | WEIGHT: 150 LBS | TEMPERATURE: 96.8 F | SYSTOLIC BLOOD PRESSURE: 114 MMHG | DIASTOLIC BLOOD PRESSURE: 68 MMHG | BODY MASS INDEX: 25.61 KG/M2 | HEART RATE: 70 BPM | OXYGEN SATURATION: 97 % | RESPIRATION RATE: 16 BRPM

## 2023-07-27 DIAGNOSIS — E11.9 TYPE 2 DIABETES MELLITUS WITHOUT COMPLICATION, WITHOUT LONG-TERM CURRENT USE OF INSULIN (HCC): ICD-10-CM

## 2023-07-27 LAB
HBA1C MFR BLD: 5.9 % (ref ?–5.8)
POCT INT CON NEG: NEGATIVE
POCT INT CON POS: POSITIVE

## 2023-07-27 PROCEDURE — 3078F DIAST BP <80 MM HG: CPT | Performed by: NURSE PRACTITIONER

## 2023-07-27 PROCEDURE — 3074F SYST BP LT 130 MM HG: CPT | Performed by: NURSE PRACTITIONER

## 2023-07-27 PROCEDURE — 83036 HEMOGLOBIN GLYCOSYLATED A1C: CPT | Performed by: NURSE PRACTITIONER

## 2023-07-27 PROCEDURE — 99213 OFFICE O/P EST LOW 20 MIN: CPT | Performed by: NURSE PRACTITIONER

## 2023-07-27 ASSESSMENT — ENCOUNTER SYMPTOMS
CONSTITUTIONAL NEGATIVE: 1
FEVER: 0
MUSCULOSKELETAL NEGATIVE: 1
PALPITATIONS: 0
NEUROLOGICAL NEGATIVE: 1
GASTROINTESTINAL NEGATIVE: 1
COUGH: 0
SHORTNESS OF BREATH: 0
SPUTUM PRODUCTION: 0

## 2023-07-27 ASSESSMENT — FIBROSIS 4 INDEX: FIB4 SCORE: 2.05

## 2023-07-27 NOTE — ASSESSMENT & PLAN NOTE
Chronic; A1C goal < 6.5  A1C today in clinic 5.9 (down from 6.6). started metformin 4/2023, tolerating okay. d/seema simvastatin due to myalgia; she noted elevated FBG when she was on atorvastatin.    - continue Metformin 500mg ER QD  - counseled on eliminating food items with added sugars & processed carbs  - recheck A1C in 3 months

## 2023-07-27 NOTE — PROGRESS NOTES
Subjective       CC:   Chief Complaint   Patient presents with    Diabetes Follow-up     Last A1C 4/21/23        HPI:   Patient is a 78 y.o. established female patient with medical history listed below here today for evaluation and management of diabetes mellitus type 2. We started her on metformin at our last visit. Tolerating well.      Problem   Type 2 Diabetes Mellitus Without Complication, Without Long-Term Current Use of Insulin (Hcc)    Taking Metformin XR 500mg QD    GFR:92 Microalbum creat ratio ordered today  No hypertension; BP in range without medications  Intolerance to simvastatin; elevated BG on atorvastatin  No neuropathy reported. Normal Monofilament exam today; Seeing podiatrist for shoe inserts.    Retinal Screen: Seeing [facility]; hx macular degeneration; secondary cataract removed this year. She might be going back to [employer].            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-sided sciatica       Past Medical History:   Diagnosis Date    Arthritis     Hips, feet    Breath shortness     when had hiatal hernia    Cataract     Surgery bilat    Heart burn     High cholesterol     Indigestion     GERD        Past Surgical History:   Procedure Laterality Date    OR LAP,ESOPHAGOGAST FUNDOPLASTY N/A 8/11/2021    Procedure: FUNDOPLICATION, PARTIAL, LAPAROSCOPIC - PARAESOPHAGEAL WITH MESH.;  Surgeon: Noble Arce M.D.;  Location: SURGERY [facility];  Service: General    OTHER  1978    Breast implants    GYN SURGERY  1976    Tubal ligation    TONSILLECTOMY  1947        Current Outpatient Medications on File Prior to Visit   Medication Sig Dispense Refill    metFORMIN ER (GLUCOPHAGE XR) 500 MG TABLET SR 24 HR Take 1 Tablet by mouth every day. 90 Tablet 3    acetaminophen (TYLENOL)  "500 MG Tab Take 250 mg by mouth 1 time a day as needed. Couple times per week... OTC  Indications: Pain      olopatadine (PATANOL) 0.1 % ophthalmic solution Administer 1 Drop into both eyes 1 time a day as needed for Allergies. Through Opt... for optical rosacea.      Glycerin-Polysorbate 80 (REFRESH DRY EYE THERAPY OP) Administer 1 Drop into both eyes 1 time a day as needed.       No current facility-administered medications on file prior to visit.        Health Maintenance: Completed    ROS:  Review of Systems   Constitutional: Negative.  Negative for fever and malaise/fatigue.   Respiratory:  Negative for cough, sputum production and shortness of breath.    Cardiovascular:  Negative for chest pain, palpitations and leg swelling.   Gastrointestinal: Negative.    Genitourinary: Negative.    Musculoskeletal: Negative.    Neurological: Negative.    Endo/Heme/Allergies: Negative.        Objective       Exam:  /68   Pulse 70   Temp 36 °C (96.8 °F) (Temporal)   Resp 16   Ht 1.626 m (5' 4\")   Wt 68 kg (150 lb)   SpO2 97%   BMI 25.75 kg/m²  Body mass index is 25.75 kg/m².    Physical Exam  Constitutional:       Appearance: Normal appearance.   Musculoskeletal:      Right lower leg: No edema.      Left lower leg: No edema.   Skin:     General: Skin is warm and dry.   Neurological:      Mental Status: She is alert.       Monofilament testing with a 10 gram force: sensation intact: intact bilaterally  Visual Inspection: Feet without maceration, ulcers, fissures.  Pedal pulses: intact bilaterally    Assessment & Plan       78 y.o. female with the following -     Problem List Items Addressed This Visit       Type 2 diabetes mellitus without complication, without long-term current use of insulin (HCC)     Chronic; A1C goal < 6.5  A1C today in clinic 5.9 (down from 6.6). started metformin 4/2023, tolerating okay. d/seema simvastatin due to myalgia; she noted elevated FBG when she was on atorvastatin.    - continue " Metformin 500mg ER QD  - counseled on eliminating food items with added sugars & processed carbs  - recheck A1C in 3 months          Relevant Orders    POCT Hemoglobin A1C (Completed)    Diabetic Monofilament LE Exam (Completed)    Microalbumin Creat Ratio Urine (Lab Collect)    Lipid Profile    HEMOGLOBIN A1C     Educated in proper administration of medication(s) ordered today including safety, possible SE, risks, benefits, rationale and alternatives to therapy.     Return in about 3 months (around 10/27/2023).    Please note that this dictation was created using voice recognition software. I have made every reasonable attempt to correct obvious errors, but I expect that there are errors of grammar and possibly content that I did not discover before finalizing the note.

## 2023-10-26 ENCOUNTER — HOSPITAL ENCOUNTER (OUTPATIENT)
Dept: LAB | Facility: MEDICAL CENTER | Age: 78
End: 2023-10-26
Attending: NURSE PRACTITIONER
Payer: MEDICARE

## 2023-10-26 DIAGNOSIS — E11.9 TYPE 2 DIABETES MELLITUS WITHOUT COMPLICATION, WITHOUT LONG-TERM CURRENT USE OF INSULIN (HCC): ICD-10-CM

## 2023-10-26 LAB
CHOLEST SERPL-MCNC: 314 MG/DL (ref 100–199)
CREAT UR-MCNC: 36.74 MG/DL
EST. AVERAGE GLUCOSE BLD GHB EST-MCNC: 131 MG/DL
FASTING STATUS PATIENT QL REPORTED: NORMAL
HBA1C MFR BLD: 6.2 % (ref 4–5.6)
HDLC SERPL-MCNC: 48 MG/DL
LDLC SERPL CALC-MCNC: 219 MG/DL
MICROALBUMIN UR-MCNC: 1.2 MG/DL
MICROALBUMIN/CREAT UR: 33 MG/G (ref 0–30)
TRIGL SERPL-MCNC: 237 MG/DL (ref 0–149)

## 2023-10-26 PROCEDURE — 82570 ASSAY OF URINE CREATININE: CPT

## 2023-10-26 PROCEDURE — 36415 COLL VENOUS BLD VENIPUNCTURE: CPT | Mod: GA

## 2023-10-26 PROCEDURE — 83036 HEMOGLOBIN GLYCOSYLATED A1C: CPT | Mod: GA

## 2023-10-26 PROCEDURE — 80061 LIPID PANEL: CPT

## 2023-10-26 PROCEDURE — 82043 UR ALBUMIN QUANTITATIVE: CPT

## 2023-11-01 ENCOUNTER — OFFICE VISIT (OUTPATIENT)
Dept: MEDICAL GROUP | Facility: PHYSICIAN GROUP | Age: 78
End: 2023-11-01
Payer: MEDICARE

## 2023-11-01 VITALS
DIASTOLIC BLOOD PRESSURE: 74 MMHG | BODY MASS INDEX: 25.61 KG/M2 | HEIGHT: 64 IN | WEIGHT: 150 LBS | TEMPERATURE: 97.2 F | RESPIRATION RATE: 17 BRPM | SYSTOLIC BLOOD PRESSURE: 122 MMHG | HEART RATE: 78 BPM | OXYGEN SATURATION: 97 %

## 2023-11-01 DIAGNOSIS — E78.2 MIXED HYPERLIPIDEMIA: ICD-10-CM

## 2023-11-01 DIAGNOSIS — E11.9 TYPE 2 DIABETES MELLITUS WITHOUT COMPLICATION, WITHOUT LONG-TERM CURRENT USE OF INSULIN (HCC): ICD-10-CM

## 2023-11-01 PROCEDURE — 3078F DIAST BP <80 MM HG: CPT | Performed by: NURSE PRACTITIONER

## 2023-11-01 PROCEDURE — 3074F SYST BP LT 130 MM HG: CPT | Performed by: NURSE PRACTITIONER

## 2023-11-01 PROCEDURE — 99213 OFFICE O/P EST LOW 20 MIN: CPT | Performed by: NURSE PRACTITIONER

## 2023-11-01 RX ORDER — MULTIVIT WITH MINERALS/LUTEIN
TABLET ORAL
COMMUNITY

## 2023-11-01 RX ORDER — METFORMIN HYDROCHLORIDE 500 MG/1
500 TABLET, EXTENDED RELEASE ORAL 2 TIMES DAILY
Qty: 180 TABLET | Refills: 3 | Status: SHIPPED | OUTPATIENT
Start: 2023-11-01

## 2023-11-01 RX ORDER — ERGOCALCIFEROL 1.25 MG/1
CAPSULE ORAL
COMMUNITY

## 2023-11-01 ASSESSMENT — ENCOUNTER SYMPTOMS
GASTROINTESTINAL NEGATIVE: 1
SHORTNESS OF BREATH: 0
SPUTUM PRODUCTION: 0
PALPITATIONS: 0
COUGH: 0
FEVER: 0
NEUROLOGICAL NEGATIVE: 1
MUSCULOSKELETAL NEGATIVE: 1
CONSTITUTIONAL NEGATIVE: 1

## 2023-11-01 ASSESSMENT — FIBROSIS 4 INDEX: FIB4 SCORE: 2.05

## 2023-11-01 NOTE — PROGRESS NOTES
Subjective       CC:   Chief Complaint   Patient presents with    Diabetes Follow-up        HPI:   Patient is a 78 y.o. established female patient with medical history listed below here today for evaluation and management of diabetes mellitus type 2, hyperlipidemia.     Problem   Type 2 Diabetes Mellitus Without Complication, Without Long-Term Current Use of Insulin (Hcc)    Taking Metformin XR 500mg QD    GFR:92, Microalbum creat ratio 33  No hypertension; BP in range without medications  Intolerance to simvastatin; elevated BG on atorvastatin  No neuropathy reported. Normal Monofilament exam 7/27/2023; Seeing podiatrist for shoe inserts.    Retinal Screen: Seeing [facility]; hx macular degeneration; secondary cataract removed this year. She might be going back to [employer].        Mixed Hyperlipidemia    Did not tolerate Atorvastatin in the past  Recheck on fasting lipid remains elevated; she tried simvastatin & did not tolerate that either. She would like to work on dietary changes.          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-sided sciatica       Past Medical History:   Diagnosis Date    Arthritis     Hips, feet    Breath shortness     when had hiatal hernia    Cataract     Surgery bilat    Heart burn     High cholesterol     Indigestion     GERD        Past Surgical History:   Procedure Laterality Date    MI LAP,ESOPHAGOGAST FUNDOPLASTY N/A 8/11/2021    Procedure: FUNDOPLICATION, PARTIAL, LAPAROSCOPIC - PARAESOPHAGEAL WITH MESH.;  Surgeon: Noble Arce M.D.;  Location: SURGERY [facility];  Service: General    OTHER  1978    Breast implants    GYN SURGERY  1976    Tubal ligation    TONSILLECTOMY  1947        Current Outpatient Medications on File Prior to Visit   Medication Sig Dispense  "Refill    vitamin D2, Ergocalciferol, (DRISDOL) 1.25 MG (66671 UT) Cap capsule Take  by mouth every 7 days.      Ascorbic Acid (VITAMIN C) 1000 MG Tab Take  by mouth.      acetaminophen (TYLENOL) 500 MG Tab Take 250 mg by mouth 1 time a day as needed. Couple times per week... OTC  Indications: Pain      olopatadine (PATANOL) 0.1 % ophthalmic solution Administer 1 Drop into both eyes 1 time a day as needed for Allergies. Through Opt... for optical rosacea.      Glycerin-Polysorbate 80 (REFRESH DRY EYE THERAPY OP) Administer 1 Drop into both eyes 1 time a day as needed.       No current facility-administered medications on file prior to visit.        Health Maintenance: Completed    ROS:  Review of Systems   Constitutional: Negative.  Negative for fever and malaise/fatigue.   Respiratory:  Negative for cough, sputum production and shortness of breath.    Cardiovascular:  Negative for chest pain, palpitations and leg swelling.   Gastrointestinal: Negative.    Genitourinary: Negative.    Musculoskeletal: Negative.    Neurological: Negative.    Endo/Heme/Allergies: Negative.      Objective       Exam:  /74   Pulse 78   Temp 36.2 °C (97.2 °F) (Temporal)   Resp 17   Ht 1.626 m (5' 4\")   Wt 68 kg (150 lb)   SpO2 97%   BMI 25.75 kg/m²  Body mass index is 25.75 kg/m².    Physical Exam  Constitutional:       Appearance: Normal appearance.   Musculoskeletal:      Right lower leg: No edema.      Left lower leg: No edema.   Skin:     General: Skin is warm and dry.   Neurological:      Mental Status: She is alert.       Labs: reviewed with patient    Assessment & Plan       78 y.o. female with the following -     Problem List Items Addressed This Visit       Mixed hyperlipidemia      Latest Reference Range & Units 01/16/23 09:26 10/26/23 09:23   Cholesterol,Tot 100 - 199 mg/dL 217 (H) 314 (H)   Triglycerides 0 - 149 mg/dL 166 (H) 237 (H)   HDL >=40 mg/dL 56 48   LDL <100 mg/dL 128 (H) 219 (H)   - increase in lipid " profile once she stopped taking Atorvastatin. States she stopped taking because it elevated FBG. This was rechecked recently and remains elevated even though she stopped taking atorvastatin 6 months ago    She understands cardiovascular risks. Willing to try low dose simvastatin & this was added in 10/2022; tolerating up until recent month, states having muscle aches; also A1c has gone up   - d/c Simvastatin; continue with daily COQ10  - counseled on heart healthy eating  - monitor fasting lipid with CMP Q6 months         Type 2 diabetes mellitus without complication, without long-term current use of insulin (HCC)     Chronic; A1C goal < 6.5  A1C at 6.2 on 10/26/2023 (up from 5.9). Started metformin 4/2023, tolerating okay. d/seema simvastatin due to myalgia; she noted elevated FBG when she was on atorvastatin so stopped taking.    - continue Metformin 500mg ER QD; she does not wish to increase dose at this time. She will work on dietary changes.   - counseled on eliminating food items with added sugars & processed carbs  - recheck A1C in 3 months          Relevant Medications    metFORMIN ER (GLUCOPHAGE XR) 500 MG TABLET SR 24 HR    Other Relevant Orders    HEMOGLOBIN A1C       Medications Prescribed Today:  1. Type 2 diabetes mellitus without complication, without long-term current use of insulin (HCC)  - metFORMIN ER (GLUCOPHAGE XR) 500 MG TABLET SR 24 HR; Take 1 Tablet by mouth 2 times a day.  Dispense: 180 Tablet; Refill: 3    Educated in proper administration of medication(s) ordered today including safety, possible SE, risks, benefits, rationale and alternatives to therapy.     Return in about 4 months (around 3/1/2024) for dm2 mgt.    Please note that this dictation was created using voice recognition software. I have made every reasonable attempt to correct obvious errors, but I expect that there are errors of grammar and possibly content that I did not discover before finalizing the note.

## 2023-11-02 NOTE — ASSESSMENT & PLAN NOTE
Latest Reference Range & Units 01/16/23 09:26 10/26/23 09:23   Cholesterol,Tot 100 - 199 mg/dL 217 (H) 314 (H)   Triglycerides 0 - 149 mg/dL 166 (H) 237 (H)   HDL >=40 mg/dL 56 48   LDL <100 mg/dL 128 (H) 219 (H)     - increase in lipid profile once she stopped taking Atorvastatin. States she stopped taking because it elevated FBG. This was rechecked recently and remains elevated even though she stopped taking atorvastatin 6 months ago    She understands cardiovascular risks. Willing to try low dose simvastatin & this was added in 10/2022; tolerating up until recent month, states having muscle aches; also A1c has gone up   - d/c Simvastatin; continue with daily COQ10  - counseled on heart healthy eating  - monitor fasting lipid with [facility] Q6 months

## 2023-11-02 NOTE — ASSESSMENT & PLAN NOTE
Chronic; A1C goal < 6.5  A1C at 6.2 on 10/26/2023 (up from 5.9). Started metformin 4/2023, tolerating okay. d/seema simvastatin due to myalgia; she noted elevated FBG when she was on atorvastatin so stopped taking.    - continue Metformin 500mg ER QD; she does not wish to increase dose at this time. She will work on dietary changes.   - counseled on eliminating food items with added sugars & processed carbs  - recheck A1C in 3 months

## 2023-11-29 ENCOUNTER — PATIENT MESSAGE (OUTPATIENT)
Dept: HEALTH INFORMATION MANAGEMENT | Facility: OTHER | Age: 78
End: 2023-11-29

## 2024-02-22 ENCOUNTER — HOSPITAL ENCOUNTER (OUTPATIENT)
Dept: LAB | Facility: MEDICAL CENTER | Age: 79
End: 2024-02-22
Attending: NURSE PRACTITIONER
Payer: MEDICARE

## 2024-02-22 DIAGNOSIS — E11.9 TYPE 2 DIABETES MELLITUS WITHOUT COMPLICATION, WITHOUT LONG-TERM CURRENT USE OF INSULIN (HCC): ICD-10-CM

## 2024-02-22 LAB
EST. AVERAGE GLUCOSE BLD GHB EST-MCNC: 126 MG/DL
HBA1C MFR BLD: 6 % (ref 4–5.6)

## 2024-02-22 PROCEDURE — 83036 HEMOGLOBIN GLYCOSYLATED A1C: CPT | Mod: GA

## 2024-02-22 PROCEDURE — 36415 COLL VENOUS BLD VENIPUNCTURE: CPT | Mod: GA

## 2024-03-05 ENCOUNTER — APPOINTMENT (OUTPATIENT)
Dept: MEDICAL GROUP | Facility: PHYSICIAN GROUP | Age: 79
End: 2024-03-05
Payer: MEDICARE

## 2024-03-12 ENCOUNTER — OFFICE VISIT (OUTPATIENT)
Dept: MEDICAL GROUP | Facility: PHYSICIAN GROUP | Age: 79
End: 2024-03-12
Payer: MEDICARE

## 2024-03-12 VITALS
SYSTOLIC BLOOD PRESSURE: 122 MMHG | HEIGHT: 65 IN | HEART RATE: 78 BPM | WEIGHT: 149 LBS | DIASTOLIC BLOOD PRESSURE: 78 MMHG | OXYGEN SATURATION: 97 % | TEMPERATURE: 97.6 F | BODY MASS INDEX: 24.83 KG/M2

## 2024-03-12 DIAGNOSIS — E11.9 TYPE 2 DIABETES MELLITUS WITHOUT COMPLICATION, WITHOUT LONG-TERM CURRENT USE OF INSULIN (HCC): ICD-10-CM

## 2024-03-12 PROCEDURE — 3074F SYST BP LT 130 MM HG: CPT | Performed by: NURSE PRACTITIONER

## 2024-03-12 PROCEDURE — 3078F DIAST BP <80 MM HG: CPT | Performed by: NURSE PRACTITIONER

## 2024-03-12 PROCEDURE — 99213 OFFICE O/P EST LOW 20 MIN: CPT | Performed by: NURSE PRACTITIONER

## 2024-03-12 RX ORDER — SENNOSIDES 8.6 MG
650 CAPSULE ORAL EVERY 6 HOURS PRN
COMMUNITY
Start: 2024-02-01

## 2024-03-12 ASSESSMENT — ENCOUNTER SYMPTOMS
GASTROINTESTINAL NEGATIVE: 1
RESPIRATORY NEGATIVE: 1
CARDIOVASCULAR NEGATIVE: 1
CONSTITUTIONAL NEGATIVE: 1
ALLERGIC/IMMUNOLOGIC NEGATIVE: 1
MUSCULOSKELETAL NEGATIVE: 1
EYES NEGATIVE: 1
HEMATOLOGIC/LYMPHATIC NEGATIVE: 1
PSYCHIATRIC NEGATIVE: 1
WEAKNESS: 1
ENDOCRINE NEGATIVE: 1

## 2024-03-12 ASSESSMENT — PATIENT HEALTH QUESTIONNAIRE - PHQ9: CLINICAL INTERPRETATION OF PHQ2 SCORE: 0

## 2024-03-12 ASSESSMENT — FIBROSIS 4 INDEX: FIB4 SCORE: 2.05

## 2024-03-12 NOTE — PROGRESS NOTES
"Verbal consent was acquired by the patient to use [facility] ambient listening note generation during this visit Yes      Subjective   Shelby Mireles is a 78 y.o. female patient of Ashley Andersen DNP who presents for diabetes follow up.  History of Present Illness  The patient presents for follow-up of diabetes.    She has cut out almost all sugar from her diet. She still eats fruit and vegetables. She has been trying to increase her protein intake because she has been noticing that she is getting weaker. She tries to walk about 5 miles a day. She lifts things, but her legs are not as strong as they used to be. She is a vegetarian. She eats fish. She has started eating quinoa, soy foods, and tofu. She has been increasing her protein intake for a couple of months. She has not noticed anything getting worse. She is still taking metformin  mg 2 tablets once a day. She does not check her blood sugars at home.   She has muscle aches with the statin. It also increased her blood sugar.    Review of Systems   Constitutional: Negative.    HENT: Negative.     Eyes: Negative.    Respiratory: Negative.     Cardiovascular: Negative.    Gastrointestinal: Negative.    Endocrine: Negative.    Genitourinary: Negative.    Musculoskeletal: Negative.    Skin: Negative.    Allergic/Immunologic: Negative.    Neurological:  Positive for weakness.   Hematological: Negative.    Psychiatric/Behavioral: Negative.       Objective   /78 (BP Location: Left arm, Patient Position: Sitting, BP Cuff Size: Large adult)   Pulse 78   Temp 36.4 °C (97.6 °F) (Temporal)   Ht 1.656 m (5' 5.2\")   Wt 67.6 kg (149 lb)   SpO2 97%   Physical Exam  General: Well nourished, well developed female in NAD, awake and conversant.  Eyes: Normal conjunctiva, anicteric.  Round symmetrical pupils.  ENT: Hearing grossly intact.  No nasal discharge.  Neck: Neck is supple.  No masses or thyromegaly.  CV: No lower extremity edema.  Respiratory: Respirations " are nonlabored.  No wheezing.  Abdomen: Non-Distended.  Skin: Warm.  No rashes or ulcers.  MSK: Normal ambulation.  No clubbing or cyanosis.  Neuro: Sensation and CN II-XII grossly normal.  Psych: Alert and oriented.  Cooperative, appropriate mood and affect, normal judgment.      Results  Laboratory Studies  Labs were reviewed with the patient.     Assessment & Plan  1. Type 2 diabetes mellitus without complication, without long-term current use of insulin (HCC)  New to examiner, chronic and improving for the patient. A1c has improved from 6.2%in 10/2023 to 6.0%. Continue metformin ER 1000 mg once daily. Continue to work on decreasing sugar and carbohydrates in diet while increasing protein. Continue to stay active walking 5 miles per day. Follow up in 3 months with PCP to review A1c and CMP. Does not need any medications refilled at this time.   - HEMOGLOBIN A1C; Future  - Comp Metabolic Panel; Future     Return in about 3 months (around 6/12/2024) for Diabetes, Follow up with PCP.     Please note that this dictation was created using voice recognition software. I have made every reasonable attempt to correct obvious errors, but I expect that there are errors of grammar and possibly content that I did not discover before finalizing the note.

## 2024-06-03 ENCOUNTER — HOSPITAL ENCOUNTER (OUTPATIENT)
Dept: LAB | Facility: MEDICAL CENTER | Age: 79
End: 2024-06-03
Attending: NURSE PRACTITIONER
Payer: MEDICARE

## 2024-06-03 DIAGNOSIS — E11.9 TYPE 2 DIABETES MELLITUS WITHOUT COMPLICATION, WITHOUT LONG-TERM CURRENT USE OF INSULIN (HCC): ICD-10-CM

## 2024-06-03 LAB
ALBUMIN SERPL BCP-MCNC: 4.5 G/DL (ref 3.2–4.9)
ALBUMIN/GLOB SERPL: 1.8 G/DL
ALP SERPL-CCNC: 67 U/L (ref 30–99)
ALT SERPL-CCNC: 17 U/L (ref 2–50)
ANION GAP SERPL CALC-SCNC: 12 MMOL/L (ref 7–16)
AST SERPL-CCNC: 21 U/L (ref 12–45)
BILIRUB SERPL-MCNC: 0.7 MG/DL (ref 0.1–1.5)
BUN SERPL-MCNC: 15 MG/DL (ref 8–22)
CALCIUM ALBUM COR SERPL-MCNC: 9 MG/DL (ref 8.5–10.5)
CALCIUM SERPL-MCNC: 9.4 MG/DL (ref 8.5–10.5)
CHLORIDE SERPL-SCNC: 103 MMOL/L (ref 96–112)
CO2 SERPL-SCNC: 24 MMOL/L (ref 20–33)
CREAT SERPL-MCNC: 0.56 MG/DL (ref 0.5–1.4)
EST. AVERAGE GLUCOSE BLD GHB EST-MCNC: 123 MG/DL
FASTING STATUS PATIENT QL REPORTED: NORMAL
GFR SERPLBLD CREATININE-BSD FMLA CKD-EPI: 92 ML/MIN/1.73 M 2
GLOBULIN SER CALC-MCNC: 2.5 G/DL (ref 1.9–3.5)
GLUCOSE SERPL-MCNC: 107 MG/DL (ref 65–99)
HBA1C MFR BLD: 5.9 % (ref 4–5.6)
POTASSIUM SERPL-SCNC: 4.4 MMOL/L (ref 3.6–5.5)
PROT SERPL-MCNC: 7 G/DL (ref 6–8.2)
SODIUM SERPL-SCNC: 139 MMOL/L (ref 135–145)

## 2024-06-03 PROCEDURE — 36415 COLL VENOUS BLD VENIPUNCTURE: CPT

## 2024-06-03 PROCEDURE — 83036 HEMOGLOBIN GLYCOSYLATED A1C: CPT | Mod: GA

## 2024-06-03 PROCEDURE — 80053 COMPREHEN METABOLIC PANEL: CPT

## 2024-06-12 ENCOUNTER — OFFICE VISIT (OUTPATIENT)
Dept: MEDICAL GROUP | Facility: PHYSICIAN GROUP | Age: 79
End: 2024-06-12
Payer: MEDICARE

## 2024-06-12 VITALS
OXYGEN SATURATION: 96 % | TEMPERATURE: 97 F | RESPIRATION RATE: 16 BRPM | WEIGHT: 143 LBS | HEART RATE: 68 BPM | HEIGHT: 63 IN | BODY MASS INDEX: 25.34 KG/M2 | SYSTOLIC BLOOD PRESSURE: 116 MMHG | DIASTOLIC BLOOD PRESSURE: 78 MMHG

## 2024-06-12 DIAGNOSIS — E11.9 TYPE 2 DIABETES MELLITUS WITHOUT COMPLICATION, WITHOUT LONG-TERM CURRENT USE OF INSULIN (HCC): ICD-10-CM

## 2024-06-12 PROCEDURE — 3078F DIAST BP <80 MM HG: CPT | Performed by: NURSE PRACTITIONER

## 2024-06-12 PROCEDURE — 3074F SYST BP LT 130 MM HG: CPT | Performed by: NURSE PRACTITIONER

## 2024-06-12 PROCEDURE — 99213 OFFICE O/P EST LOW 20 MIN: CPT | Performed by: NURSE PRACTITIONER

## 2024-06-12 ASSESSMENT — ENCOUNTER SYMPTOMS
PALPITATIONS: 0
CONSTITUTIONAL NEGATIVE: 1
SHORTNESS OF BREATH: 0
COUGH: 0
GASTROINTESTINAL NEGATIVE: 1
MUSCULOSKELETAL NEGATIVE: 1
FEVER: 0
SPUTUM PRODUCTION: 0
NEUROLOGICAL NEGATIVE: 1

## 2024-06-12 ASSESSMENT — FIBROSIS 4 INDEX: FIB4 SCORE: 2.24

## 2024-06-12 NOTE — ASSESSMENT & PLAN NOTE
Chronic; A1C goal < 6.5  A1C at 5.9 on 6/3/2024 (down from 6.2). Started metformin 4/2023, tolerating okay. d/seema simvastatin due to myalgia; she noted elevated FBG when she was on atorvastatin so stopped taking.    - continue Metformin 1000mg ER QD; she does not wish to increase dose at this time. She will work on dietary changes.   - counseled on eliminating food items with added sugars & processed carbs  - recheck A1C in 3 months

## 2024-06-12 NOTE — PROGRESS NOTES
Subjective       CC:   Chief Complaint   Patient presents with    Follow-Up     diabetes    Lab Results        HPI:   Patient is a 79 y.o. established female patient with medical history listed below here today for evaluation and management of diabetes mellitus type 2. Doing well on metformin.     Problem   Type 2 Diabetes Mellitus Without Complication, Without Long-Term Current Use of Insulin (Hcc)    Taking Metformin XR 1000mg QPM    GFR:92, Microalbum creat ratio 33  No hypertension; BP in range without medications  Intolerance to simvastatin; elevated BG on atorvastatin  No neuropathy reported. Normal Monofilament exam 7/27/2023; Seeing podiatrist for shoe inserts.    Retinal Screen: Seeing [facility]; hx macular degeneration; secondary cataract removed this year. She might be going back to [facility].            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-sided sciatica       Past Medical History:   Diagnosis Date    Arthritis     Hips, feet    Breath shortness     when had hiatal hernia    Cataract     Surgery bilat    Heart burn     High cholesterol     Indigestion     GERD        Past Surgical History:   Procedure Laterality Date    ND LAP,ESOPHAGOGAST FUNDOPLASTY N/A 8/11/2021    Procedure: FUNDOPLICATION, PARTIAL, LAPAROSCOPIC - PARAESOPHAGEAL WITH MESH.;  Surgeon: Noble Arce M.D.;  Location: SURGERY [facility];  Service: General    OTHER  1978    Breast implants    GYN SURGERY  1976    Tubal ligation    TONSILLECTOMY  1947        Current Outpatient Medications on File Prior to Visit   Medication Sig Dispense Refill    acetaminophen (TYLENOL 8 HOUR) 650 MG CR tablet Take 650 mg by mouth every 6 hours as needed.      vitamin D2, Ergocalciferol, (DRISDOL) 1.25 MG (87384 UT) Cap capsule Take  " by mouth every 7 days.      Ascorbic Acid (VITAMIN C) 1000 MG Tab Take  by mouth.      metFORMIN ER (GLUCOPHAGE XR) 500 MG TABLET SR 24 HR Take 1 Tablet by mouth 2 times a day. 180 Tablet 3    acetaminophen (TYLENOL) 500 MG Tab Take 250 mg by mouth 1 time a day as needed. Couple times per week... OTC  Indications: Pain      olopatadine (PATANOL) 0.1 % ophthalmic solution Administer 1 Drop into both eyes 1 time a day as needed for Allergies. Through Opt... for optical rosacea.      Glycerin-Polysorbate 80 (REFRESH DRY EYE THERAPY OP) Administer 1 Drop into both eyes 1 time a day as needed.       No current facility-administered medications on file prior to visit.        Health Maintenance: Completed    ROS:  Review of Systems   Constitutional: Negative.  Negative for fever and malaise/fatigue.   Respiratory:  Negative for cough, sputum production and shortness of breath.    Cardiovascular:  Negative for chest pain, palpitations and leg swelling.   Gastrointestinal: Negative.    Genitourinary: Negative.    Musculoskeletal: Negative.    Neurological: Negative.    Endo/Heme/Allergies: Negative.      Objective       Exam:  /78 (BP Location: Right arm, Patient Position: Sitting, BP Cuff Size: Adult)   Pulse 68   Temp 36.1 °C (97 °F) (Temporal)   Resp 16   Ht 1.6 m (5' 3\")   Wt 64.9 kg (143 lb)   SpO2 96%   BMI 25.33 kg/m²  Body mass index is 25.33 kg/m².    Physical Exam  Constitutional:       Appearance: Normal appearance.   Neurological:      Mental Status: She is alert.         Labs: reviewed    Assessment & Plan       79 y.o. female with the following -     Problem List Items Addressed This Visit       Type 2 diabetes mellitus without complication, without long-term current use of insulin (HCC)     Chronic; A1C goal < 6.5  A1C at 5.9 on 6/3/2024 (down from 6.2). Started metformin 4/2023, tolerating okay. d/seema simvastatin due to myalgia; she noted elevated FBG when she was on atorvastatin so stopped " taking.    - continue Metformin 1000mg ER QD; she does not wish to increase dose at this time. She will work on dietary changes.   - counseled on eliminating food items with added sugars & processed carbs  - recheck A1C in 3 months             Educated in proper administration of medication(s) ordered today including safety, possible SE, risks, benefits, rationale and alternatives to therapy.     Return in about 4 weeks (around 7/10/2024) for establish care.    Please note that this dictation was created using voice recognition software. I have made every reasonable attempt to correct obvious errors, but I expect that there are errors of grammar and possibly content that I did not discover before finalizing the note.

## 2024-07-13 SDOH — ECONOMIC STABILITY: HOUSING INSECURITY: IN THE LAST 12 MONTHS, HOW MANY PLACES HAVE YOU LIVED?: 1

## 2024-07-13 SDOH — ECONOMIC STABILITY: FOOD INSECURITY: WITHIN THE PAST 12 MONTHS, THE FOOD YOU BOUGHT JUST DIDN'T LAST AND YOU DIDN'T HAVE MONEY TO GET MORE.: NEVER TRUE

## 2024-07-13 SDOH — HEALTH STABILITY: PHYSICAL HEALTH: ON AVERAGE, HOW MANY MINUTES DO YOU ENGAGE IN EXERCISE AT THIS LEVEL?: 80 MIN

## 2024-07-13 SDOH — ECONOMIC STABILITY: INCOME INSECURITY: IN THE LAST 12 MONTHS, WAS THERE A TIME WHEN YOU WERE NOT ABLE TO PAY THE MORTGAGE OR RENT ON TIME?: NO

## 2024-07-13 SDOH — HEALTH STABILITY: PHYSICAL HEALTH: ON AVERAGE, HOW MANY DAYS PER WEEK DO YOU ENGAGE IN MODERATE TO STRENUOUS EXERCISE (LIKE A BRISK WALK)?: 7 DAYS

## 2024-07-13 SDOH — ECONOMIC STABILITY: FOOD INSECURITY: WITHIN THE PAST 12 MONTHS, YOU WORRIED THAT YOUR FOOD WOULD RUN OUT BEFORE YOU GOT MONEY TO BUY MORE.: NEVER TRUE

## 2024-07-13 SDOH — ECONOMIC STABILITY: INCOME INSECURITY: HOW HARD IS IT FOR YOU TO PAY FOR THE VERY BASICS LIKE FOOD, HOUSING, MEDICAL CARE, AND HEATING?: NOT HARD AT ALL

## 2024-07-13 ASSESSMENT — LIFESTYLE VARIABLES
HOW OFTEN DO YOU HAVE A DRINK CONTAINING ALCOHOL: NEVER
AUDIT-C TOTAL SCORE: 0
SKIP TO QUESTIONS 9-10: 1
HOW MANY STANDARD DRINKS CONTAINING ALCOHOL DO YOU HAVE ON A TYPICAL DAY: PATIENT DOES NOT DRINK
HOW OFTEN DO YOU HAVE SIX OR MORE DRINKS ON ONE OCCASION: NEVER

## 2024-07-13 ASSESSMENT — SOCIAL DETERMINANTS OF HEALTH (SDOH)
HOW OFTEN DO YOU ATTENT MEETINGS OF THE CLUB OR ORGANIZATION YOU BELONG TO?: MORE THAN 4 TIMES PER YEAR
HOW OFTEN DO YOU ATTEND CHURCH OR RELIGIOUS SERVICES?: NEVER
HOW MANY DRINKS CONTAINING ALCOHOL DO YOU HAVE ON A TYPICAL DAY WHEN YOU ARE DRINKING: PATIENT DOES NOT DRINK
HOW OFTEN DO YOU ATTEND CHURCH OR RELIGIOUS SERVICES?: NEVER
HOW OFTEN DO YOU GET TOGETHER WITH FRIENDS OR RELATIVES?: ONCE A WEEK
WITHIN THE PAST 12 MONTHS, YOU WORRIED THAT YOUR FOOD WOULD RUN OUT BEFORE YOU GOT THE MONEY TO BUY MORE: NEVER TRUE
HOW OFTEN DO YOU HAVE A DRINK CONTAINING ALCOHOL: NEVER
DO YOU BELONG TO ANY CLUBS OR ORGANIZATIONS SUCH AS CHURCH GROUPS UNIONS, FRATERNAL OR ATHLETIC GROUPS, OR SCHOOL GROUPS?: YES
DO YOU BELONG TO ANY CLUBS OR ORGANIZATIONS SUCH AS CHURCH GROUPS UNIONS, FRATERNAL OR ATHLETIC GROUPS, OR SCHOOL GROUPS?: YES
IN A TYPICAL WEEK, HOW MANY TIMES DO YOU TALK ON THE PHONE WITH FAMILY, FRIENDS, OR NEIGHBORS?: ONCE A WEEK
HOW HARD IS IT FOR YOU TO PAY FOR THE VERY BASICS LIKE FOOD, HOUSING, MEDICAL CARE, AND HEATING?: NOT HARD AT ALL
HOW OFTEN DO YOU ATTENT MEETINGS OF THE CLUB OR ORGANIZATION YOU BELONG TO?: MORE THAN 4 TIMES PER YEAR
HOW OFTEN DO YOU HAVE SIX OR MORE DRINKS ON ONE OCCASION: NEVER
IN A TYPICAL WEEK, HOW MANY TIMES DO YOU TALK ON THE PHONE WITH FAMILY, FRIENDS, OR NEIGHBORS?: ONCE A WEEK
HOW OFTEN DO YOU GET TOGETHER WITH FRIENDS OR RELATIVES?: ONCE A WEEK
IN THE PAST 12 MONTHS, HAS THE ELECTRIC, GAS, OIL, OR WATER COMPANY THREATENED TO SHUT OFF SERVICE IN YOUR HOME?: NO

## 2024-07-16 ENCOUNTER — OFFICE VISIT (OUTPATIENT)
Dept: MEDICAL GROUP | Facility: PHYSICIAN GROUP | Age: 79
End: 2024-07-16
Payer: MEDICARE

## 2024-07-16 VITALS
HEIGHT: 65 IN | SYSTOLIC BLOOD PRESSURE: 124 MMHG | TEMPERATURE: 97.3 F | WEIGHT: 142 LBS | DIASTOLIC BLOOD PRESSURE: 68 MMHG | BODY MASS INDEX: 23.66 KG/M2 | HEART RATE: 67 BPM | OXYGEN SATURATION: 97 %

## 2024-07-16 DIAGNOSIS — Z76.89 ENCOUNTER TO ESTABLISH CARE: ICD-10-CM

## 2024-07-16 DIAGNOSIS — Z12.83 SKIN CANCER SCREENING: ICD-10-CM

## 2024-07-16 DIAGNOSIS — E78.2 MIXED HYPERLIPIDEMIA: ICD-10-CM

## 2024-07-16 DIAGNOSIS — L40.9 PSORIASIS: ICD-10-CM

## 2024-07-16 DIAGNOSIS — M19.90 ARTHRITIS: ICD-10-CM

## 2024-07-16 DIAGNOSIS — E11.9 TYPE 2 DIABETES MELLITUS WITHOUT COMPLICATION, WITHOUT LONG-TERM CURRENT USE OF INSULIN (HCC): ICD-10-CM

## 2024-07-16 PROBLEM — M54.42 CHRONIC LEFT-SIDED LOW BACK PAIN WITH LEFT-SIDED SCIATICA: Chronic | Status: ACTIVE | Noted: 2022-10-24

## 2024-07-16 PROBLEM — M85.80 OSTEOPENIA: Chronic | Status: ACTIVE | Noted: 2021-12-13

## 2024-07-16 PROBLEM — G89.29 CHRONIC LEFT-SIDED LOW BACK PAIN WITH LEFT-SIDED SCIATICA: Chronic | Status: ACTIVE | Noted: 2022-10-24

## 2024-07-16 PROCEDURE — 99214 OFFICE O/P EST MOD 30 MIN: CPT | Performed by: NURSE PRACTITIONER

## 2024-07-16 PROCEDURE — 3078F DIAST BP <80 MM HG: CPT | Performed by: NURSE PRACTITIONER

## 2024-07-16 PROCEDURE — 3074F SYST BP LT 130 MM HG: CPT | Performed by: NURSE PRACTITIONER

## 2024-07-16 RX ORDER — EZETIMIBE 10 MG/1
10 TABLET ORAL DAILY
Qty: 90 TABLET | Refills: 1 | Status: SHIPPED | OUTPATIENT
Start: 2024-07-16

## 2024-07-16 RX ORDER — METFORMIN HYDROCHLORIDE 500 MG/1
1000 TABLET, EXTENDED RELEASE ORAL DAILY
Qty: 180 TABLET | Refills: 3 | Status: SHIPPED | OUTPATIENT
Start: 2024-07-16

## 2024-07-16 ASSESSMENT — FIBROSIS 4 INDEX: FIB4 SCORE: 2.24

## 2024-08-14 ENCOUNTER — OFFICE VISIT (OUTPATIENT)
Dept: DERMATOLOGY | Facility: IMAGING CENTER | Age: 79
End: 2024-08-14
Payer: MEDICARE

## 2024-08-14 DIAGNOSIS — L82.1 SK (SEBORRHEIC KERATOSIS): ICD-10-CM

## 2024-08-14 PROCEDURE — 99212 OFFICE O/P EST SF 10 MIN: CPT | Performed by: NURSE PRACTITIONER

## 2024-08-14 NOTE — PROGRESS NOTES
DERMATOLOGY NOTE  NEW VISIT       Chief complaint: Establish Care and Skin Lesion     HPI/location: rough spots arms and chest   Time present: 5 years  Painful lesion: No  Itching lesion: No  Enlarging lesion: Yes  Anything make it better or worse?      History of skin cancer: No  History of precancers/actinic keratoses: No  History of biopsies:Yes, Details: inclusive   History of blistering/severe sunburns:No  Family history of skin cancer:No  Family history of atypical moles:No      Allergies   Allergen Reactions    Lipitor [Atorvastatin]      Myalgias, and elevated glucose    Simvastatin Myalgia    Tetracycline      Pt states she got a rash        MEDICATIONS:  Medications relevant to specialty reviewed.     REVIEW OF SYSTEMS:   Positive for skin (see HPI)  Negative for fevers and chills       EXAM:  There were no vitals taken for this visit.  Constitutional: Well-developed, well-nourished, and in no distress.     A focused skin exam was performed including the affected areas of the bilateral upper extremities and trunk and hands. Notable findings on exam today listed below and/or in assessment/plan.     few tan stuck-on waxy papules scattered on the trunk and extremities, specifically to lesions of concern per HPI    IMPRESSION / PLAN:    1. SK (seborrheic keratosis)  - Benign-appearing nature of lesions discussed during exam.   - Reassurance provided  - Advised to continue to monitor for any return to clinic for new or concerning changes.          Please note that this dictation was created using voice recognition software. I have made every reasonable attempt to correct obvious errors, but I expect that there are errors of grammar and possibly content that I did not discover before finalizing the note.      Return to clinic in: Return for PRN. and as needed for any new or changing skin lesions.

## 2024-11-13 ENCOUNTER — HOSPITAL ENCOUNTER (OUTPATIENT)
Dept: LAB | Facility: MEDICAL CENTER | Age: 79
End: 2024-11-13
Attending: NURSE PRACTITIONER
Payer: MEDICARE

## 2024-11-13 DIAGNOSIS — E78.2 MIXED HYPERLIPIDEMIA: ICD-10-CM

## 2024-11-13 DIAGNOSIS — E11.9 TYPE 2 DIABETES MELLITUS WITHOUT COMPLICATION, WITHOUT LONG-TERM CURRENT USE OF INSULIN (HCC): ICD-10-CM

## 2024-11-13 LAB
CHOLEST SERPL-MCNC: 243 MG/DL (ref 100–199)
CREAT UR-MCNC: 58.07 MG/DL
EST. AVERAGE GLUCOSE BLD GHB EST-MCNC: 126 MG/DL
FASTING STATUS PATIENT QL REPORTED: NORMAL
HBA1C MFR BLD: 6 % (ref 4–5.6)
HDLC SERPL-MCNC: 57 MG/DL
LDLC SERPL CALC-MCNC: 150 MG/DL
MICROALBUMIN UR-MCNC: <1.2 MG/DL
MICROALBUMIN/CREAT UR: NORMAL MG/G (ref 0–30)
TRIGL SERPL-MCNC: 180 MG/DL (ref 0–149)

## 2024-11-13 PROCEDURE — 83036 HEMOGLOBIN GLYCOSYLATED A1C: CPT | Mod: GA

## 2024-11-13 PROCEDURE — 36415 COLL VENOUS BLD VENIPUNCTURE: CPT | Mod: GA

## 2024-11-13 PROCEDURE — 80061 LIPID PANEL: CPT

## 2024-11-13 PROCEDURE — 82570 ASSAY OF URINE CREATININE: CPT

## 2024-11-13 PROCEDURE — 82043 UR ALBUMIN QUANTITATIVE: CPT

## 2024-11-25 ENCOUNTER — APPOINTMENT (OUTPATIENT)
Dept: MEDICAL GROUP | Facility: PHYSICIAN GROUP | Age: 79
End: 2024-11-25
Payer: MEDICARE

## 2024-12-12 ENCOUNTER — APPOINTMENT (OUTPATIENT)
Dept: URGENT CARE | Facility: PHYSICIAN GROUP | Age: 79
End: 2024-12-12
Payer: MEDICARE

## 2024-12-12 ENCOUNTER — OFFICE VISIT (OUTPATIENT)
Dept: URGENT CARE | Facility: PHYSICIAN GROUP | Age: 79
End: 2024-12-12
Payer: MEDICARE

## 2024-12-12 VITALS
DIASTOLIC BLOOD PRESSURE: 80 MMHG | HEIGHT: 64 IN | OXYGEN SATURATION: 100 % | TEMPERATURE: 97.3 F | BODY MASS INDEX: 23.64 KG/M2 | RESPIRATION RATE: 16 BRPM | HEART RATE: 71 BPM | SYSTOLIC BLOOD PRESSURE: 142 MMHG | WEIGHT: 138.5 LBS

## 2024-12-12 DIAGNOSIS — H10.33 ACUTE CONJUNCTIVITIS OF BOTH EYES, UNSPECIFIED ACUTE CONJUNCTIVITIS TYPE: ICD-10-CM

## 2024-12-12 PROCEDURE — 3077F SYST BP >= 140 MM HG: CPT | Performed by: FAMILY MEDICINE

## 2024-12-12 PROCEDURE — 99213 OFFICE O/P EST LOW 20 MIN: CPT | Performed by: FAMILY MEDICINE

## 2024-12-12 PROCEDURE — 3079F DIAST BP 80-89 MM HG: CPT | Performed by: FAMILY MEDICINE

## 2024-12-12 RX ORDER — POLYMYXIN B SULFATE AND TRIMETHOPRIM 1; 10000 MG/ML; [USP'U]/ML
1 SOLUTION OPHTHALMIC 4 TIMES DAILY
Qty: 10 ML | Refills: 0 | Status: SHIPPED | OUTPATIENT
Start: 2024-12-12 | End: 2024-12-19

## 2024-12-12 ASSESSMENT — FIBROSIS 4 INDEX: FIB4 SCORE: 2.24

## 2024-12-13 ASSESSMENT — ENCOUNTER SYMPTOMS
NAUSEA: 0
VOMITING: 0
WEIGHT LOSS: 0
MYALGIAS: 0

## 2024-12-13 NOTE — PROGRESS NOTES
"Subjective     Shelby Zuly Mireles is a 79 y.o. female who presents with Eye Burn (Both eyes burn, since Monday, uses \"pataday\" eye drops for allergies once a day)            4 days of bilateral eye redness and draining.  Initially thought allergies but has not responded to her usual drops.  No contact lens use.  No vision loss.  No trauma or foreign body.  No other aggravating or alleviating factors.        Review of Systems   Constitutional:  Negative for malaise/fatigue and weight loss.   Gastrointestinal:  Negative for nausea and vomiting.   Musculoskeletal:  Negative for joint pain and myalgias.   Skin:  Negative for itching and rash.              Objective     BP (!) 142/80 (BP Location: Right arm, Patient Position: Sitting, BP Cuff Size: Small adult)   Pulse 71   Temp 36.3 °C (97.3 °F) (Temporal)   Resp 16   Ht 1.626 m (5' 4\")   Wt 62.8 kg (138 lb 8 oz)   SpO2 100%   BMI 23.77 kg/m²      Physical Exam  Constitutional:       General: She is not in acute distress.     Appearance: She is well-developed.   HENT:      Head: Normocephalic and atraumatic.      Right Ear: Tympanic membrane normal.      Left Ear: Tympanic membrane normal.      Nose: Congestion present.      Mouth/Throat:      Mouth: Mucous membranes are moist.      Pharynx: Oropharynx is clear. No posterior oropharyngeal erythema.   Eyes:      Extraocular Movements: Extraocular movements intact.      Pupils: Pupils are equal, round, and reactive to light.      Comments: B conjunctiva injected with mild exudate. No foreign body. No gross corneal lesion.     Cardiovascular:      Rate and Rhythm: Normal rate and regular rhythm.      Heart sounds: Normal heart sounds. No murmur heard.  Pulmonary:      Effort: Pulmonary effort is normal.      Breath sounds: Normal breath sounds. No wheezing.   Skin:     General: Skin is warm and dry.      Findings: No rash.   Neurological:      Mental Status: She is alert.                             Assessment & Plan "        Assessment & Plan  Acute conjunctivitis of both eyes, unspecified acute conjunctivitis type    Orders:    polymixin-trimethoprim (POLYTRIM) 04458-5.1 UNIT/ML-% Solution; Administer 1 Drop into both eyes 4 times a day for 7 days.     Differential diagnosis, natural history, supportive care, and indications for immediate follow-up were discussed.

## 2024-12-23 ENCOUNTER — APPOINTMENT (OUTPATIENT)
Dept: MEDICAL GROUP | Facility: PHYSICIAN GROUP | Age: 79
End: 2024-12-23
Payer: MEDICARE

## 2024-12-23 VITALS
BODY MASS INDEX: 22.99 KG/M2 | WEIGHT: 138 LBS | DIASTOLIC BLOOD PRESSURE: 66 MMHG | TEMPERATURE: 97.4 F | HEIGHT: 65 IN | OXYGEN SATURATION: 98 % | HEART RATE: 87 BPM | SYSTOLIC BLOOD PRESSURE: 130 MMHG

## 2024-12-23 DIAGNOSIS — M19.90 ARTHRITIS: Chronic | ICD-10-CM

## 2024-12-23 DIAGNOSIS — E78.2 MIXED HYPERLIPIDEMIA: Chronic | ICD-10-CM

## 2024-12-23 DIAGNOSIS — Z71.2 ENCOUNTER TO DISCUSS TEST RESULTS: ICD-10-CM

## 2024-12-23 DIAGNOSIS — E11.9 TYPE 2 DIABETES MELLITUS WITHOUT COMPLICATION, WITHOUT LONG-TERM CURRENT USE OF INSULIN (HCC): ICD-10-CM

## 2024-12-23 PROCEDURE — 99214 OFFICE O/P EST MOD 30 MIN: CPT | Performed by: NURSE PRACTITIONER

## 2024-12-23 PROCEDURE — 92250 FUNDUS PHOTOGRAPHY W/I&R: CPT | Mod: 26 | Performed by: NURSE PRACTITIONER

## 2024-12-23 PROCEDURE — 3078F DIAST BP <80 MM HG: CPT | Performed by: NURSE PRACTITIONER

## 2024-12-23 PROCEDURE — 3075F SYST BP GE 130 - 139MM HG: CPT | Performed by: NURSE PRACTITIONER

## 2024-12-23 RX ORDER — EZETIMIBE 10 MG/1
10 TABLET ORAL DAILY
Qty: 90 TABLET | Refills: 3 | Status: SHIPPED | OUTPATIENT
Start: 2024-12-23

## 2024-12-23 ASSESSMENT — FIBROSIS 4 INDEX: FIB4 SCORE: 2.24

## 2024-12-23 NOTE — PROGRESS NOTES
Subjective:     CC: lab results     HPI:   Shelby presents today with the following:    Type 2 diabetes mellitus without complication, without long-term current use of insulin ([facility])  Recent A1c 6.0%.  Continues metformin ER 1000 mg daily.  Due for retinal screening today.    Mixed hyperlipidemia  Recent labs show uncontrolled cholesterol.  She has not been able to tolerate atorvastatin or simvastatin.  She was started on Zetia 10 mg daily at her last appointment. Total cholesterol, triglycerides and LDL remain elevated but have improved.  HDL increased to 57. She has been able to tolerate Zetia without any side effects.  Continue Zetia 10 mg daily.    Arthritis  Continues Tylenol arthritis twice daily.  Discussed meloxicam and would she like to continue with Tylenol as her mother had GI bleed.      Past Medical History:   Diagnosis Date    Arthritis     Hips, feet    Breath shortness     when had hiatal hernia    Cataract     Surgery bilat    Heart burn     High cholesterol     Indigestion     GERD       Social History     Tobacco Use    Smoking status: Never    Smokeless tobacco: Never   Vaping Use    Vaping status: Never Used   Substance Use Topics    Alcohol use: Not Currently    Drug use: Not Currently       Current Outpatient Medications Ordered in Epic   Medication Sig Dispense Refill    ezetimibe (ZETIA) 10 MG Tab Take 1 Tablet by mouth every day. 90 Tablet 3    coenzyme Q-10 30 MG capsule Take 60 mg by mouth every day.      CALCIUM CARBONATE-VIT D-MIN PO Take  by mouth every day.      metFORMIN ER (GLUCOPHAGE XR) 500 MG TABLET SR 24 HR Take 2 Tablets by mouth every day. 180 Tablet 3    acetaminophen (TYLENOL 8 HOUR) 650 MG CR tablet Take 650 mg by mouth every 6 hours as needed. 1 tablet Bid      Ascorbic Acid (VITAMIN C) 1000 MG Tab Take  by mouth.      acetaminophen (TYLENOL) 500 MG Tab Take 250 mg by mouth 1 time a day as needed. Couple times per week... OTC  Indications: Pain      olopatadine (PATANOL) 0.1  "% ophthalmic solution Administer 1 Drop into both eyes 1 time a day as needed for Allergies. Through Opt... for optical rosacea.      Glycerin-Polysorbate 80 (REFRESH DRY EYE THERAPY OP) Administer 1 Drop into both eyes 1 time a day as needed.       No current Epic-ordered facility-administered medications on file.       Allergies:  Lipitor [atorvastatin], Simvastatin, and Tetracycline    Health Maintenance: Completing retinal      Objective:     Vital signs reviewed  Exam:  /66 (BP Location: Right arm, Patient Position: Sitting, BP Cuff Size: Adult)   Pulse 87   Temp 36.3 °C (97.4 °F) (Temporal)   Ht 1.651 m (5' 5\")   Wt 62.6 kg (138 lb)   SpO2 98%   BMI 22.96 kg/m²  Body mass index is 22.96 kg/m².    Gen: Alert and oriented, No apparent distress.  Lungs: Normal effort, CTA bilaterally, no wheezes, rhonchi, or rales  CV: Regular rate and rhythm. No murmurs, rubs, or gallops.      Assessment & Plan:     79 y.o. female with the following -     1. Type 2 diabetes mellitus without complication, without long-term current use of insulin (HCC)  Chronic stable problem.  Continue metformin ER 1000 mg daily.  Completing retinal exam today.  Due for labs around June 2025.  - POCT Retinal Eye Exam  - CBC WITHOUT DIFFERENTIAL; Future  - Comp Metabolic Panel; Future  - HEMOGLOBIN A1C; Future    2. Mixed hyperlipidemia  Chronic exacerbated problem.  Cholesterol is improving.  Continue Zetia 10 mg daily.  Due for repeat labs around June 2025.  - ezetimibe (ZETIA) 10 MG Tab; Take 1 Tablet by mouth every day.  Dispense: 90 Tablet; Refill: 3  - Lipid Profile; Future    3. Arthritis  Chronic stable problem.  Continue over-the-counter Tylenol arthritis.      4. Encounter to discuss test results  Acute uncomplicated problem.  Discussed and reviewed lab results from 11/13/2024.      Return in about 7 months (around 7/23/2025) for annual wellness visit.    Please note that this dictation was created using voice recognition " software. I have made every reasonable attempt to correct obvious errors, but I expect that there are errors of grammar and possibly content that I did not discover before finalizing the note.

## 2024-12-23 NOTE — ASSESSMENT & PLAN NOTE
Continues Tylenol arthritis twice daily.  Discussed meloxicam and would she like to continue with Tylenol as her mother had GI bleed.

## 2024-12-23 NOTE — ASSESSMENT & PLAN NOTE
Recent labs show uncontrolled cholesterol.  She has not been able to tolerate atorvastatin or simvastatin.  She was started on Zetia 10 mg daily at her last appointment. Total cholesterol, triglycerides and LDL remain elevated but have improved.  HDL increased to 57. She has been able to tolerate Zetia without any side effects.  Continue Zetia 10 mg daily.

## 2024-12-24 LAB — RETINAL SCREEN: NEGATIVE

## 2025-07-01 ENCOUNTER — HOSPITAL ENCOUNTER (OUTPATIENT)
Dept: LAB | Facility: MEDICAL CENTER | Age: 80
End: 2025-07-01
Attending: NURSE PRACTITIONER
Payer: MEDICARE

## 2025-07-01 DIAGNOSIS — E78.2 MIXED HYPERLIPIDEMIA: Chronic | ICD-10-CM

## 2025-07-01 DIAGNOSIS — E11.9 TYPE 2 DIABETES MELLITUS WITHOUT COMPLICATION, WITHOUT LONG-TERM CURRENT USE OF INSULIN (HCC): ICD-10-CM

## 2025-07-01 LAB
ALBUMIN SERPL BCP-MCNC: 4.4 G/DL (ref 3.2–4.9)
ALBUMIN/GLOB SERPL: 1.8 G/DL
ALP SERPL-CCNC: 59 U/L (ref 30–99)
ALT SERPL-CCNC: 16 U/L (ref 2–50)
ANION GAP SERPL CALC-SCNC: 11 MMOL/L (ref 7–16)
AST SERPL-CCNC: 24 U/L (ref 12–45)
BILIRUB SERPL-MCNC: 0.6 MG/DL (ref 0.1–1.5)
BUN SERPL-MCNC: 16 MG/DL (ref 8–22)
CALCIUM ALBUM COR SERPL-MCNC: 9.1 MG/DL (ref 8.5–10.5)
CALCIUM SERPL-MCNC: 9.4 MG/DL (ref 8.5–10.5)
CHLORIDE SERPL-SCNC: 104 MMOL/L (ref 96–112)
CHOLEST SERPL-MCNC: 245 MG/DL (ref 100–199)
CO2 SERPL-SCNC: 24 MMOL/L (ref 20–33)
CREAT SERPL-MCNC: 0.59 MG/DL (ref 0.5–1.4)
EST. AVERAGE GLUCOSE BLD GHB EST-MCNC: 123 MG/DL
FASTING STATUS PATIENT QL REPORTED: NORMAL
GFR SERPLBLD CREATININE-BSD FMLA CKD-EPI: 90 ML/MIN/1.73 M 2
GLOBULIN SER CALC-MCNC: 2.4 G/DL (ref 1.9–3.5)
GLUCOSE SERPL-MCNC: 109 MG/DL (ref 65–99)
HBA1C MFR BLD: 5.9 % (ref 4–5.6)
HDLC SERPL-MCNC: 53 MG/DL
LDLC SERPL CALC-MCNC: 155 MG/DL
POTASSIUM SERPL-SCNC: 4.3 MMOL/L (ref 3.6–5.5)
PROT SERPL-MCNC: 6.8 G/DL (ref 6–8.2)
SODIUM SERPL-SCNC: 139 MMOL/L (ref 135–145)
TRIGL SERPL-MCNC: 186 MG/DL (ref 0–149)

## 2025-07-01 PROCEDURE — 83036 HEMOGLOBIN GLYCOSYLATED A1C: CPT | Mod: GA

## 2025-07-01 PROCEDURE — 80053 COMPREHEN METABOLIC PANEL: CPT

## 2025-07-01 PROCEDURE — 80061 LIPID PANEL: CPT

## 2025-07-01 PROCEDURE — 36415 COLL VENOUS BLD VENIPUNCTURE: CPT

## 2025-08-03 SDOH — HEALTH STABILITY: PHYSICAL HEALTH: ON AVERAGE, HOW MANY MINUTES DO YOU ENGAGE IN EXERCISE AT THIS LEVEL?: 70 MIN

## 2025-08-03 SDOH — ECONOMIC STABILITY: FOOD INSECURITY: WITHIN THE PAST 12 MONTHS, YOU WORRIED THAT YOUR FOOD WOULD RUN OUT BEFORE YOU GOT MONEY TO BUY MORE.: NEVER TRUE

## 2025-08-03 SDOH — ECONOMIC STABILITY: FOOD INSECURITY: WITHIN THE PAST 12 MONTHS, THE FOOD YOU BOUGHT JUST DIDN'T LAST AND YOU DIDN'T HAVE MONEY TO GET MORE.: NEVER TRUE

## 2025-08-03 SDOH — ECONOMIC STABILITY: INCOME INSECURITY: IN THE LAST 12 MONTHS, WAS THERE A TIME WHEN YOU WERE NOT ABLE TO PAY THE MORTGAGE OR RENT ON TIME?: NO

## 2025-08-03 SDOH — HEALTH STABILITY: PHYSICAL HEALTH: ON AVERAGE, HOW MANY DAYS PER WEEK DO YOU ENGAGE IN MODERATE TO STRENUOUS EXERCISE (LIKE A BRISK WALK)?: 7 DAYS

## 2025-08-03 SDOH — ECONOMIC STABILITY: INCOME INSECURITY: HOW HARD IS IT FOR YOU TO PAY FOR THE VERY BASICS LIKE FOOD, HOUSING, MEDICAL CARE, AND HEATING?: NOT HARD AT ALL

## 2025-08-03 ASSESSMENT — LIFESTYLE VARIABLES
AUDIT-C TOTAL SCORE: 0
SKIP TO QUESTIONS 9-10: 1
HOW OFTEN DO YOU HAVE SIX OR MORE DRINKS ON ONE OCCASION: NEVER
HOW MANY STANDARD DRINKS CONTAINING ALCOHOL DO YOU HAVE ON A TYPICAL DAY: PATIENT DOES NOT DRINK
HOW OFTEN DO YOU HAVE A DRINK CONTAINING ALCOHOL: NEVER

## 2025-08-03 ASSESSMENT — SOCIAL DETERMINANTS OF HEALTH (SDOH)
HOW OFTEN DO YOU GET TOGETHER WITH FRIENDS OR RELATIVES?: ONCE A WEEK
IN A TYPICAL WEEK, HOW MANY TIMES DO YOU TALK ON THE PHONE WITH FAMILY, FRIENDS, OR NEIGHBORS?: NEVER
HOW OFTEN DO YOU HAVE A DRINK CONTAINING ALCOHOL: NEVER
HOW OFTEN DO YOU ATTEND CHURCH OR RELIGIOUS SERVICES?: NEVER
DO YOU BELONG TO ANY CLUBS OR ORGANIZATIONS SUCH AS CHURCH GROUPS UNIONS, FRATERNAL OR ATHLETIC GROUPS, OR SCHOOL GROUPS?: YES
HOW MANY DRINKS CONTAINING ALCOHOL DO YOU HAVE ON A TYPICAL DAY WHEN YOU ARE DRINKING: PATIENT DOES NOT DRINK
HOW OFTEN DO YOU HAVE SIX OR MORE DRINKS ON ONE OCCASION: NEVER
HOW HARD IS IT FOR YOU TO PAY FOR THE VERY BASICS LIKE FOOD, HOUSING, MEDICAL CARE, AND HEATING?: NOT HARD AT ALL
WITHIN THE PAST 12 MONTHS, YOU WORRIED THAT YOUR FOOD WOULD RUN OUT BEFORE YOU GOT THE MONEY TO BUY MORE: NEVER TRUE
HOW OFTEN DO YOU GET TOGETHER WITH FRIENDS OR RELATIVES?: ONCE A WEEK
HOW OFTEN DO YOU ATTENT MEETINGS OF THE CLUB OR ORGANIZATION YOU BELONG TO?: MORE THAN 4 TIMES PER YEAR
HOW OFTEN DO YOU ATTENT MEETINGS OF THE CLUB OR ORGANIZATION YOU BELONG TO?: MORE THAN 4 TIMES PER YEAR
IN A TYPICAL WEEK, HOW MANY TIMES DO YOU TALK ON THE PHONE WITH FAMILY, FRIENDS, OR NEIGHBORS?: NEVER
IN THE PAST 12 MONTHS, HAS THE ELECTRIC, GAS, OIL, OR WATER COMPANY THREATENED TO SHUT OFF SERVICE IN YOUR HOME?: NO
HOW OFTEN DO YOU ATTEND CHURCH OR RELIGIOUS SERVICES?: NEVER
DO YOU BELONG TO ANY CLUBS OR ORGANIZATIONS SUCH AS CHURCH GROUPS UNIONS, FRATERNAL OR ATHLETIC GROUPS, OR SCHOOL GROUPS?: YES

## 2025-08-06 ENCOUNTER — APPOINTMENT (OUTPATIENT)
Dept: MEDICAL GROUP | Facility: PHYSICIAN GROUP | Age: 80
End: 2025-08-06
Payer: MEDICARE

## 2025-08-06 ASSESSMENT — PATIENT HEALTH QUESTIONNAIRE - PHQ9
SUM OF ALL RESPONSES TO PHQ QUESTIONS 1-9: 4
5. POOR APPETITE OR OVEREATING: 0 - NOT AT ALL
CLINICAL INTERPRETATION OF PHQ2 SCORE: 2

## 2025-08-06 ASSESSMENT — ENCOUNTER SYMPTOMS: GENERAL WELL-BEING: FAIR

## 2025-08-06 ASSESSMENT — ACTIVITIES OF DAILY LIVING (ADL): BATHING_REQUIRES_ASSISTANCE: 0

## (undated) DEVICE — SENSOR SPO2 NEO LNCS ADHESIVE (20/BX) SEE USER NOTES

## (undated) DEVICE — CANISTER SUCTION 3000ML MECHANICAL FILTER AUTO SHUTOFF MEDI-VAC NONSTERILE LF DISP  (40EA/CA)

## (undated) DEVICE — SUTURE 4-0 VICRYL PLUS FS-2 - 27 INCH (36/BX)

## (undated) DEVICE — NEEDLE INSFL 120MM 14GA VRRS - (20/BX)

## (undated) DEVICE — LACTATED RINGERS INJ 1000 ML - (14EA/CA 60CA/PF)

## (undated) DEVICE — SLEEVE, VASO, THIGH, MED

## (undated) DEVICE — SUTURE GENERAL

## (undated) DEVICE — DRESSING TRANSPARENT FILM TEGADERM 2.375 X 2.75"  (100EA/BX)"

## (undated) DEVICE — NEPTUNE 4 PORT MANIFOLD - (20/PK)

## (undated) DEVICE — ENDOSTITCH10MM SUTURING DEVIC - (3/CA)

## (undated) DEVICE — GLOVE BIOGEL PI ORTHO SZ 6 1/2 SURGICAL PF LF (40PR/BX)

## (undated) DEVICE — TUBE E-T HI-LO CUFF 6.5MM (10EA/BX)

## (undated) DEVICE — PACK LAP CHOLE OR - (2EA/CA)

## (undated) DEVICE — ELECTRODE DUAL RETURN W/ CORD - (50/PK)

## (undated) DEVICE — GLOVE BIOGEL PI INDICATOR SZ 6.5 SURGICAL PF LF - (50/BX 4BX/CA)

## (undated) DEVICE — TUBE NG SALEM SUMP 16FR (50EA/CA)

## (undated) DEVICE — CHLORAPREP 26 ML APPLICATOR - ORANGE TINT(25/CA)

## (undated) DEVICE — ENDOSTITCH LOAD UNIT 0 SURGI - 12/CA

## (undated) DEVICE — SYRINGE 50ML CATHETER TIP (40EA/BX 4BX/CA)

## (undated) DEVICE — GLOVE BIOGEL INDICATOR SZ 7SURGICAL PF LTX - (50/BX 4BX/CA)

## (undated) DEVICE — SET EXTENSION WITH 2 PORTS (48EA/CA) ***PART #2C8610 IS A SUBSTITUTE*****

## (undated) DEVICE — GLOVE BIOGEL SZ 6.5 SURGICAL PF LTX (50PR/BX 4BX/CA)

## (undated) DEVICE — TOWEL STOP TIMEOUT SAFETY FLAG (40EA/CA)

## (undated) DEVICE — MASK ANESTHESIA ADULT  - (100/CA)

## (undated) DEVICE — SODIUM CHL IRRIGATION 0.9% 1000ML (12EA/CA)

## (undated) DEVICE — CANNULA W/SEAL 5X100 Z-THRE - ADED KII (12/BX)

## (undated) DEVICE — TUBING INSUFFLATION PNEUMOCLEAR HIGH-FLOW (10EA/BX)

## (undated) DEVICE — SET SUCTION/IRRIGATION WITH DISPOSABLE TIP (6/CA )PART #0250-070-520 IS A SUB

## (undated) DEVICE — SPONGE GAUZESTER. 2X2 4-PL - (2/PK 50PK/BX 30BX/CS)

## (undated) DEVICE — ELECTRODE 850 FOAM ADHESIVE - HYDROGEL RADIOTRNSPRNT (50/PK)

## (undated) DEVICE — GOWN SURGICAL X-LARGE ULTRA - FILM-REINFORCED (20/CA)

## (undated) DEVICE — GOWN WARMING STANDARD FLEX - (30/CA)

## (undated) DEVICE — PROTECTOR ULNA NERVE - (36PR/CA)

## (undated) DEVICE — PLUMEPEN ULTRA 3/8 IN X 10 FT HOSE (20EA/CA)

## (undated) DEVICE — SEALER VESSEL HARMONIC ACE PLUS WITH ADVANCED HEMOSTASIS 36CM (1/EA)

## (undated) DEVICE — SUCTION INSTRUMENT YANKAUER BULBOUS TIP W/O VENT (50EA/CA)

## (undated) DEVICE — KIT ANESTHESIA W/CIRCUIT & 3/LT BAG W/FILTER (20EA/CA)

## (undated) DEVICE — GLOVE BIOGEL SZ 7.5 SURGICAL PF LTX - (50PR/BX 4BX/CA)

## (undated) DEVICE — HEAD HOLDER JUNIOR/ADULT

## (undated) DEVICE — GLOVE SZ 6.5 BIOGEL PI MICRO - PF LF (50PR/BX)

## (undated) DEVICE — TUBING CLEARLINK DUO-VENT - C-FLO (48EA/CA)

## (undated) DEVICE — SET LEADWIRE 5 LEAD BEDSIDE DISPOSABLE ECG (1SET OF 5/EA)

## (undated) DEVICE — TUBING LAPAROSCOPIC PLUME DEVICE (10EA/CA)

## (undated) DEVICE — TROCAR Z THREAD11MM OPTICAL - NON BLADED(6/BX)

## (undated) DEVICE — TROCAR 5X100 NON BLADED Z-TH - READ KII (6/BX)

## (undated) DEVICE — GLOVE BIOGEL INDICATOR SZ 7.5 SURGICAL PF LTX - (50PR/BX 4BX/CA)